# Patient Record
Sex: FEMALE | Employment: FULL TIME | URBAN - METROPOLITAN AREA
[De-identification: names, ages, dates, MRNs, and addresses within clinical notes are randomized per-mention and may not be internally consistent; named-entity substitution may affect disease eponyms.]

---

## 2020-01-02 ENCOUNTER — OFFICE VISIT (OUTPATIENT)
Dept: PRIMARY CARE CLINIC | Age: 30
End: 2020-01-02
Payer: COMMERCIAL

## 2020-01-02 VITALS
BODY MASS INDEX: 22.2 KG/M2 | DIASTOLIC BLOOD PRESSURE: 72 MMHG | WEIGHT: 130 LBS | OXYGEN SATURATION: 99 % | SYSTOLIC BLOOD PRESSURE: 110 MMHG | HEART RATE: 73 BPM | RESPIRATION RATE: 16 BRPM | HEIGHT: 64 IN

## 2020-01-02 PROBLEM — J45.990 EXERCISE-INDUCED ASTHMA: Status: ACTIVE | Noted: 2020-01-02

## 2020-01-02 PROCEDURE — G8484 FLU IMMUNIZE NO ADMIN: HCPCS | Performed by: INTERNAL MEDICINE

## 2020-01-02 PROCEDURE — G8420 CALC BMI NORM PARAMETERS: HCPCS | Performed by: INTERNAL MEDICINE

## 2020-01-02 PROCEDURE — G8427 DOCREV CUR MEDS BY ELIG CLIN: HCPCS | Performed by: INTERNAL MEDICINE

## 2020-01-02 PROCEDURE — 99203 OFFICE O/P NEW LOW 30 MIN: CPT | Performed by: INTERNAL MEDICINE

## 2020-01-02 PROCEDURE — 1036F TOBACCO NON-USER: CPT | Performed by: INTERNAL MEDICINE

## 2020-01-02 RX ORDER — M-VIT,TX,IRON,MINS/CALC/FOLIC 27MG-0.4MG
1 TABLET ORAL DAILY
COMMUNITY

## 2020-01-02 ASSESSMENT — PATIENT HEALTH QUESTIONNAIRE - PHQ9
SUM OF ALL RESPONSES TO PHQ QUESTIONS 1-9: 0
SUM OF ALL RESPONSES TO PHQ9 QUESTIONS 1 & 2: 0
2. FEELING DOWN, DEPRESSED OR HOPELESS: 0
SUM OF ALL RESPONSES TO PHQ QUESTIONS 1-9: 0
1. LITTLE INTEREST OR PLEASURE IN DOING THINGS: 0

## 2020-01-04 ASSESSMENT — ENCOUNTER SYMPTOMS
ABDOMINAL DISTENTION: 0
SINUS PRESSURE: 0
WHEEZING: 0
SINUS PAIN: 0
NAUSEA: 0
BACK PAIN: 0
COUGH: 0
ABDOMINAL PAIN: 0
SHORTNESS OF BREATH: 0
DIARRHEA: 0
VOMITING: 0
CONSTIPATION: 0

## 2020-03-30 ENCOUNTER — HOSPITAL ENCOUNTER (OUTPATIENT)
Age: 30
Setting detail: SPECIMEN
Discharge: HOME | End: 2020-03-30
Payer: COMMERCIAL

## 2020-03-30 ENCOUNTER — OFFICE VISIT (OUTPATIENT)
Dept: OBGYN CLINIC | Age: 30
End: 2020-03-30
Payer: COMMERCIAL

## 2020-03-30 VITALS
HEIGHT: 64 IN | BODY MASS INDEX: 22.71 KG/M2 | WEIGHT: 133 LBS | SYSTOLIC BLOOD PRESSURE: 122 MMHG | DIASTOLIC BLOOD PRESSURE: 68 MMHG | HEART RATE: 97 BPM

## 2020-03-30 LAB
-: ABNORMAL
ABO/RH: NORMAL
ABSOLUTE EOS #: 0.07 K/UL (ref 0–0.44)
ABSOLUTE IMMATURE GRANULOCYTE: 0.04 K/UL (ref 0–0.3)
ABSOLUTE LYMPH #: 1.64 K/UL (ref 1.1–3.7)
ABSOLUTE MONO #: 0.55 K/UL (ref 0.1–1.2)
AMORPHOUS: ABNORMAL
AMPHETAMINE SCREEN URINE: NEGATIVE
ANTIBODY SCREEN: NEGATIVE
BACTERIA: ABNORMAL
BARBITURATE SCREEN URINE: NEGATIVE
BASOPHILS # BLD: 0 % (ref 0–2)
BASOPHILS ABSOLUTE: 0.03 K/UL (ref 0–0.2)
BENZODIAZEPINE SCREEN, URINE: NEGATIVE
BILIRUBIN URINE: NEGATIVE
BUPRENORPHINE URINE: NORMAL
CANNABINOID SCREEN URINE: NEGATIVE
CASTS UA: ABNORMAL /LPF (ref 0–8)
COCAINE METABOLITE, URINE: NEGATIVE
COLOR: YELLOW
COMMENT UA: ABNORMAL
CONTROL: PRESENT
CRYSTALS, UA: ABNORMAL /HPF
DIFFERENTIAL TYPE: ABNORMAL
DIRECT EXAM: NORMAL
EOSINOPHILS RELATIVE PERCENT: 1 % (ref 1–4)
EPITHELIAL CELLS UA: ABNORMAL /HPF (ref 0–5)
GLUCOSE URINE: NEGATIVE
HCT VFR BLD CALC: 37.7 % (ref 36.3–47.1)
HEMOGLOBIN: 13 G/DL (ref 11.9–15.1)
HEPATITIS B SURFACE ANTIGEN: NONREACTIVE
HIV AG/AB: NONREACTIVE
IMMATURE GRANULOCYTES: 0 %
KETONES, URINE: NEGATIVE
LEUKOCYTE ESTERASE, URINE: ABNORMAL
LYMPHOCYTES # BLD: 17 % (ref 24–43)
Lab: NORMAL
MCH RBC QN AUTO: 31.3 PG (ref 25.2–33.5)
MCHC RBC AUTO-ENTMCNC: 34.5 G/DL (ref 28.4–34.8)
MCV RBC AUTO: 90.8 FL (ref 82.6–102.9)
MDMA URINE: NORMAL
METHADONE SCREEN, URINE: NEGATIVE
METHAMPHETAMINE, URINE: NORMAL
MONOCYTES # BLD: 6 % (ref 3–12)
MUCUS: ABNORMAL
NITRITE, URINE: NEGATIVE
NRBC AUTOMATED: 0 PER 100 WBC
OPIATES, URINE: NEGATIVE
OTHER OBSERVATIONS UA: ABNORMAL
OXYCODONE SCREEN URINE: NEGATIVE
PDW BLD-RTO: 12.6 % (ref 11.8–14.4)
PH UA: >9 (ref 5–8)
PHENCYCLIDINE, URINE: NEGATIVE
PLATELET # BLD: 188 K/UL (ref 138–453)
PLATELET ESTIMATE: ABNORMAL
PMV BLD AUTO: 11.4 FL (ref 8.1–13.5)
PREGNANCY TEST URINE, POC: POSITIVE
PROPOXYPHENE, URINE: NORMAL
PROTEIN UA: ABNORMAL
RBC # BLD: 4.15 M/UL (ref 3.95–5.11)
RBC # BLD: ABNORMAL 10*6/UL
RBC UA: ABNORMAL /HPF (ref 0–4)
RENAL EPITHELIAL, UA: ABNORMAL /HPF
RUBV IGG SER QL: 27 IU/ML
SEG NEUTROPHILS: 76 % (ref 36–65)
SEGMENTED NEUTROPHILS ABSOLUTE COUNT: 7.39 K/UL (ref 1.5–8.1)
SPECIFIC GRAVITY UA: 1.02 (ref 1–1.03)
SPECIMEN DESCRIPTION: NORMAL
T. PALLIDUM, IGG: NONREACTIVE
TEST INFORMATION: NORMAL
TRICHOMONAS: ABNORMAL
TRICYCLIC ANTIDEPRESSANTS, UR: NORMAL
TURBIDITY: ABNORMAL
URINE HGB: NEGATIVE
UROBILINOGEN, URINE: NORMAL
WBC # BLD: 9.7 K/UL (ref 3.5–11.3)
WBC # BLD: ABNORMAL 10*3/UL
WBC UA: ABNORMAL /HPF (ref 0–5)
YEAST: ABNORMAL

## 2020-03-30 PROCEDURE — 1036F TOBACCO NON-USER: CPT | Performed by: OBSTETRICS & GYNECOLOGY

## 2020-03-30 PROCEDURE — 81025 URINE PREGNANCY TEST: CPT | Performed by: OBSTETRICS & GYNECOLOGY

## 2020-03-30 PROCEDURE — 99203 OFFICE O/P NEW LOW 30 MIN: CPT | Performed by: OBSTETRICS & GYNECOLOGY

## 2020-03-30 PROCEDURE — G8428 CUR MEDS NOT DOCUMENT: HCPCS | Performed by: OBSTETRICS & GYNECOLOGY

## 2020-03-30 PROCEDURE — G8420 CALC BMI NORM PARAMETERS: HCPCS | Performed by: OBSTETRICS & GYNECOLOGY

## 2020-03-30 PROCEDURE — G8484 FLU IMMUNIZE NO ADMIN: HCPCS | Performed by: OBSTETRICS & GYNECOLOGY

## 2020-03-30 NOTE — PROGRESS NOTES
[facility] PHYSICIANS  [facility] [address] [place] [address]  Dept: [phone]  3/30/2020    Jeff Meneses is a 33 yo G1 female who presents for her initial confirmation of pregnancy. Planned/unplanned:  Planned  MICHELLE:  Estimated Date of Delivery: None noted. [id number]  COMPLICATIONS CONCERNS: Mild asthma, uses inhalers once a month  PRENATAL HISTORY:  no complications    Blood pressure 122/68, pulse 97, height 5' 4\" (1.626 m), weight 133 lb (60.3 kg), last menstrual period 01/24/2020, not currently breastfeeding.   Past Medical History:   Diagnosis Date    Asthma      Past Surgical History:   Procedure Laterality Date    WISDOM TOOTH EXTRACTION  2019     Social History     Socioeconomic History    Marital status:      Spouse name: Not on file    Number of children: Not on file    Years of education: Not on file    Highest education level: Not on file   Occupational History    Not on file   Social Needs    Financial resource strain: Not on file    Food insecurity     Worry: Not on file     Inability: Not on file    Transportation needs     Medical: Not on file     Non-medical: Not on file   Tobacco Use    Smoking status: Never Smoker    Smokeless tobacco: Never Used   Substance and Sexual Activity    Alcohol use: Yes     Comment: socially    Drug use: Never    Sexual activity: Yes     Partners: Male   Lifestyle    Physical activity     Days per week: Not on file     Minutes per session: Not on file    Stress: Not on file   Relationships    Social connections     Talks on phone: Not on file     Gets together: Not on file     Attends Yarsani service: Not on file     Active member of club or organization: Not on file     Attends meetings of clubs or organizations: Not on file     Relationship status: Not on file    Intimate partner violence     Fear of current or ex partner: Not on file     Emotionally abused: Not on file     Physically abused: Not on file Forced sexual activity: Not on file   Other Topics Concern    Not on file   Social History Narrative    Not on file     No Known Allergies  Family History   Problem Relation Age of Onset    Breast Cancer Maternal Aunt     Breast Cancer Maternal Grandmother        ROS:  Constitutional:  Denies fever or chills, fatigue  Eyes:  Denies change in visual acuity, blurred vision, itching  HENT:  Denies nasal congestion or sore throat   Respiratory:  Denies cough or shortness of breath, difficulty breathing  Cardiovascular:  Denies chest pain or edema  GI:  Denies abdominal pain, nausea, vomiting, bloody stools or diarrhea   :  Denies dysuria, frequency, urgency  Musculoskeletal:  Denies back pain or joint pain   Integument:  Denies rash, itching, dryness  Neurologic:  Denies headache, focal weakness or sensory changes   Endocrine:  Denies polyuria or polydipsia,    Lymphatic:  Denies swollen glands,   Psychiatric:  Denies depression or anxiety       Physical findings: HEENT - Perrla, Eomi  Neck- Supple, no bruits  Lungs - Clear to auscultation. CV- Regular rate and rythym,   Abdomen - Non tender, non distended, no masses  Extremities - no weakness, no calf pain, no edema, no posterior tibial pain  Pelvis - no bleeding, no discharge, no CMT      Assessment/Plan:  Patient Active Problem List   Diagnosis    Exercise-induced asthma        Diagnosis Orders   1. Missed menses  Vaginitis DNA Probe    Chlamydia Trachomatis & Neisseria gonorrhoeae (GC) by amplified detection    US OB LESS THAN 14 WEEKS SINGLE OR FIRST GESTATION    US OB Transvaginal    Culture, Urine    POCT urine pregnancy    HIV Screen    Urinalysis    Urine Drug Screen    PRENATAL PROFILE I    PAP Smear    Cystic fibrosis gene test   Declines genetic testing    Return in about 4 weeks (around 4/27/2020) for Ob history .     Abdi Jack MD

## 2020-03-31 LAB
C TRACH DNA GENITAL QL NAA+PROBE: NEGATIVE
CULTURE: NORMAL
CYTOLOGY REPORT: NORMAL
Lab: NORMAL
N. GONORRHOEAE DNA: NEGATIVE
SPECIMEN DESCRIPTION: NORMAL
SPECIMEN DESCRIPTION: NORMAL

## 2020-04-03 ENCOUNTER — PROCEDURE VISIT (OUTPATIENT)
Dept: OBGYN CLINIC | Age: 30
End: 2020-04-03

## 2020-04-03 LAB
ABDOMINAL CIRCUMFERENCE: NORMAL
BIPARIETAL DIAMETER: NORMAL
ESTIMATED FETAL WEIGHT: NORMAL
FEMORAL DIAMETER: NORMAL
HC/AC: NORMAL
HEAD CIRCUMFERENCE: NORMAL

## 2020-04-06 ENCOUNTER — TELEPHONE (OUTPATIENT)
Dept: OBGYN CLINIC | Age: 30
End: 2020-04-06

## 2020-04-06 LAB — CYSTIC FIBROSIS: NORMAL

## 2020-04-06 NOTE — TELEPHONE ENCOUNTER
OB 10.3wks Pt calling requesting note for work. 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 Requesting work restriction note. Note to be attention to [employer]. Phone: [phone]  E-mail: [email or web address]

## 2020-04-27 ENCOUNTER — TELEMEDICINE (OUTPATIENT)
Dept: OBGYN CLINIC | Age: 30
End: 2020-04-27

## 2020-04-27 VITALS — BODY MASS INDEX: 22.31 KG/M2 | WEIGHT: 130 LBS

## 2020-04-27 PROCEDURE — 0501F PRENATAL FLOW SHEET: CPT | Performed by: OBSTETRICS & GYNECOLOGY

## 2020-04-27 NOTE — PROGRESS NOTES
of Obstetricians and Gynecologists and the  [employer]. These results do not rule out  the possibility that this individual could carry a CF mutation not  detected by this test.  The patient should understand that DNA studies  do not constitute a definitive carrier test for cystic fibrosis in all  individuals. Thus, interpretation is given as a probability (see  below). Accurate risk c alculation requires accurate family history  information. Inaccurate reporting of a family history of cystic  fibrosis will lead to errors in residual risk assessment. It should  be realized that there are many sources of diagnostic error in  molecular testing which may include trace contamination of PCR  reactions, rare genetic variants that can interfere with the analysis,  or general laboratory error. Cystic Fibrosis Carrier Rate by Racial and Ethnic Group, Before and  After Screening  [from Peyton et al (2001):  Mercedes in Med 3(2), 149-154]                                  Estimated Carrier Risk          Ethnic Group               Detection Rate                     Before Test          After Negative Test       Ashkenazi Alevism                    97%               1/29 1/930                       90%               1/29 1/240                      69%               1/65 1/207       * American                 57%               1/46 1/105   American                    No data available          1/90           No data available     *Additional information required to accurately predict risk  Note:  Residual carrier risk after negative test is modified by  presence of positive family history of CF or by admixture of ethnic  groups.   For these situations, accurate risk assessment requires Bayesian  analysis and genetic counseling    This molecular test has been approved for in vitro diagnostic use Kaveh Hammonds MD,   Amanda Arita. Karen Kenny MD     GYN Cytology    Collection Time: 03/30/20  7:39 AM   Result Value Ref Range    Cytology Report       INTERPRETATION    Cervical material, (ThinPrep vial, Imaging-assisted review):  Specimen Adequacy:       Satisfactory for evaluation.       - Endocervical/transformation zone component present. Descriptive Diagnosis:       Negative for intraepithelial lesion or malignancy. Cytotechnologist:   Cecille FELDER(ASCP)  **Electronically Signed Out**  ey/3/31/2020            Source:  1: Cervical material, (ThinPrep vial, Imaging-assisted review)    Clinical History  High Risk HPV DNA testing is requested if the diagnosis is ASC-US       N92.6 Misses menses. LMP:  1/24/2020  GYNECOLOGIC CYTOLOGY REPORT    Patient Name: Marisa Draper: [id number]  Path Number: [id number]  [facility] [address]   [place], [address]  [phone]  Fax: [phone]     POCT urine pregnancy    Collection Time: 03/30/20  9:25 AM   Result Value Ref Range    Preg Test, Ur positive     Control present    PRENATAL PROFILE I    Collection Time: 03/30/20 11:11 AM   Result Value Ref Range    WBC 9.7 3.5 - 11.3 k/uL    RBC 4.15 3.95 - 5.11 m/uL    Hemoglobin 13.0 11.9 - 15.1 g/dL    Hematocrit 37.7 36.3 - 47.1 %    MCV 90.8 82.6 - 102.9 fL    MCH 31.3 25.2 - 33.5 pg    MCHC 34.5 28.4 - 34.8 g/dL    RDW 12.6 11.8 - 14.4 %    Platelets 599 846 - 861 k/uL    MPV 11.4 8.1 - 13.5 fL    NRBC Automated 0.0 0.0 per 100 WBC    Rubella Antibody, IGG 27.0 IU/mL    Hepatitis B Surface Ag NONREACTIVE NONREACTIVE    Differential Type NOT REPORTED     Seg Neutrophils 76 (H) 36 - 65 %    Lymphocytes 17 (L) 24 - 43 %    Monocytes 6 3 - 12 %    Eosinophils % 1 1 - 4 %    Basophils 0 0 - 2 %    Immature Granulocytes 0 0 %    Segs Absolute 7.39 1.50 - 8.10 k/uL    Absolute Lymph # 1.64 1.10 - 3.70 k/uL Tricyclic Antidepressants, Urine NOT REPORTED NEGATIVE    MDMA, Urine NOT REPORTED NEGATIVE    Buprenorphine Urine NOT REPORTED NEGATIVE    Test Information       Assay provides medical screening only. The absence of expected drug(s) and/or metabolite(s) may indicate diluted or adulterated urine, limitations of testing or timing of collection. Culture, Urine    Collection Time: 03/30/20  2:26 PM   Result Value Ref Range    Specimen Description . CLEAN CATCH URINE     Special Requests NOT REPORTED     Culture NO SIGNIFICANT GROWTH    Urinalysis    Collection Time: 03/30/20  2:26 PM   Result Value Ref Range    Color, UA YELLOW YELLOW    Turbidity UA CLOUDY (A) CLEAR    Glucose, Ur NEGATIVE NEGATIVE    Bilirubin Urine NEGATIVE NEGATIVE    Ketones, Urine NEGATIVE NEGATIVE    Specific Gravity, UA 1.022 1.005 - 1.030    Urine Hgb NEGATIVE NEGATIVE    pH, UA >9.0 (H) 5.0 - 8.0    Protein, UA NEGATIVE  Verified by sulfosalicylic acid test. (A) NEGATIVE    Urobilinogen, Urine Normal Normal    Nitrite, Urine NEGATIVE NEGATIVE    Leukocyte Esterase, Urine MODERATE (A) NEGATIVE    Urinalysis Comments NOT REPORTED    Microscopic Urinalysis    Collection Time: 03/30/20  2:26 PM   Result Value Ref Range    -          WBC, UA 10 TO 20 0 - 5 /HPF    RBC, UA 0 TO 2 0 - 4 /HPF    Casts UA  0 - 8 /LPF     None Reference range defined for non-centrifuged specimen. Crystals, UA NOT REPORTED None /HPF    Epithelial Cells UA 10 TO 20 0 - 5 /HPF    Renal Epithelial, UA NOT REPORTED 0 /HPF    Bacteria, UA FEW (A) None    Mucus, UA NOT REPORTED None    Trichomonas, UA NOT REPORTED None    Amorphous, UA NOT REPORTED None    Other Observations UA NOT REPORTED NOT REQ.     Yeast, UA NOT REPORTED None   US OB Transvaginal    Collection Time: 04/03/20 12:00 AM   Result Value Ref Range    Biparietal Diameter      Abdominal Circumference      Femoral Diameter      Head Circumference      HC/AC      Estimated Fetal Weight         Past Medical History:   Diagnosis Date    Asthma                                                                    Past Surgical History:   Procedure Laterality Date    WISDOM TOOTH EXTRACTION  2019     Family History   Problem Relation Age of Onset    Breast Cancer Maternal Aunt     Breast Cancer Maternal Grandmother      Social History     Tobacco Use   Smoking Status Never Smoker   Smokeless Tobacco Never Used     Social History     Substance and Sexual Activity   Alcohol Use Yes    Comment: socially       MEDICATIONS:  Current Outpatient Medications   Medication Sig Dispense Refill    ALBUTEROL IN Inhale into the lungs      Multiple Vitamins-Minerals (THERAPEUTIC MULTIVITAMIN-MINERALS) tablet Take 1 tablet by mouth daily       No current facility-administered medications for this visit. ALLERGIES:  Patient has no known allergies. Reviewed global and practice OB care including nausea measures, nutrition, activities, warning signs, and contact information.  Offered cell free DNA screen,NT echo and Luxr .    `--------------------------------------------------------------------------  Genetic Screening/Teratology Counseling  (Include patient, FOB or anyone in either family)    1) Patient's age 28 years or > at MICHELLE: No  2) Thalassemia (Mediterranean, ): No  3) Neural Tube Defect:   No  4) Congenital heart defect:   No  5) Trisomy (e.g. Down Syndrome):  No  6) Ashutosh-sachs (Episcopal, [address]): No  7) Multiple Births:    No  8) Sickle cell (disease or trait):  No  9) Hemophilia or blood disorders:  No  10) Muscular Dystrophy:   No  11) Cystic Fibrosis:    No  12) Roro's chorea:   No  13) Mental retardation/Autism :  Yes, cousin   If yes, was person tested for fragile X: NA  14) Other inherited genetic/chromosomal disorder: No  15) Maternal metabolic disorder (DM, PKU): No  16) Child with birth defect not listed:  No  17) Recurrent pregnancy loss/stillbirth: No  18) Medications, possibility of a blood transfusion was discussed as well. The patient was not opposed to receiving a transfusion if needed. Nuchal translucency/Quad Evaluation and MSAFP single marker testing was reviewed in detail with attention to timing of testing and their windows. For patients beyond the gestational age for Nuchal translucency evaluation Quad testing was recommended. Timing for the Quad test was reviewed. Benefits of the above testing was reviewed. A second trimester amniocentesis was also made available to the patient. Risks, Benefits and non-invasive alternative testing was reviewed. The literature regarding a questionable link to pitocin augmentation and induction of labor, the assistance of labor contractions and the initiation of contractions to help delivery, have been reviewed with the patient regarding the increased potential of having a  with Attention Deficit Hyperactivity Disorder and or Autism. These two disorders and the ramifications of their impact on a child and the family caring for that child has been reviewed with the patient in detail. She was given the risks, benefits and alternatives of the use of this medication. She has agreed to its use in the delivery of her unborn child if needed at the time of delivery, Yes. The patient was questioned in detail regarding any genetic misnomer history, chromosomal abnormalities, or learning disabilities in herself, the father of the baby or their families.  SHE DENIED ANY HISTORY AS STATED ABOVE: Yes    -------------------------------------------------------------------------  Infection History:    1) Live with someone with TB/exposed to TB: No  2) Patient/partner has h/o genital herpes: No  3) Rash/viral illness since LMP:  Yes, fever and cough in February  4) History of STD:    No  5) Other: No  -------------------------------------------------------------------------  Declines genetic testing  MFM referral placed    Adrian Ray is a 34

## 2020-05-27 ENCOUNTER — ROUTINE PRENATAL (OUTPATIENT)
Dept: OBGYN CLINIC | Age: 30
End: 2020-05-27

## 2020-05-27 VITALS
SYSTOLIC BLOOD PRESSURE: 113 MMHG | HEART RATE: 93 BPM | BODY MASS INDEX: 22.83 KG/M2 | DIASTOLIC BLOOD PRESSURE: 66 MMHG | WEIGHT: 133 LBS

## 2020-05-27 PROBLEM — Z34.00 SUPERVISION OF NORMAL FIRST PREGNANCY, ANTEPARTUM: Status: ACTIVE | Noted: 2020-05-27

## 2020-05-27 PROCEDURE — 0502F SUBSEQUENT PRENATAL CARE: CPT | Performed by: OBSTETRICS & GYNECOLOGY

## 2020-06-17 ENCOUNTER — ROUTINE PRENATAL (OUTPATIENT)
Dept: PERINATAL CARE | Age: 30
End: 2020-06-17
Payer: COMMERCIAL

## 2020-06-17 VITALS
WEIGHT: 138 LBS | HEIGHT: 64 IN | BODY MASS INDEX: 23.56 KG/M2 | TEMPERATURE: 98.3 F | RESPIRATION RATE: 16 BRPM | DIASTOLIC BLOOD PRESSURE: 65 MMHG | HEART RATE: 87 BPM | SYSTOLIC BLOOD PRESSURE: 121 MMHG

## 2020-06-17 PROCEDURE — 76817 TRANSVAGINAL US OBSTETRIC: CPT | Performed by: OBSTETRICS & GYNECOLOGY

## 2020-06-17 PROCEDURE — 76805 OB US >/= 14 WKS SNGL FETUS: CPT | Performed by: OBSTETRICS & GYNECOLOGY

## 2020-06-24 ENCOUNTER — ROUTINE PRENATAL (OUTPATIENT)
Dept: OBGYN CLINIC | Age: 30
End: 2020-06-24

## 2020-06-24 VITALS
DIASTOLIC BLOOD PRESSURE: 68 MMHG | HEART RATE: 90 BPM | BODY MASS INDEX: 24.2 KG/M2 | WEIGHT: 141 LBS | SYSTOLIC BLOOD PRESSURE: 110 MMHG

## 2020-06-24 PROCEDURE — 0502F SUBSEQUENT PRENATAL CARE: CPT | Performed by: OBSTETRICS & GYNECOLOGY

## 2022-10-10 LAB
EXTERNAL GROUP B STREP ANTIGEN: NEGATIVE
EXTERNAL HEPATITIS B SURFACE ANTIGEN: NEGATIVE
EXTERNAL HEPATITIS C AB: NORMAL
EXTERNAL RUBELLA QUALITATIVE: NORMAL
EXTERNAL SYPHILIS RPR SCREEN: NORMAL
HIV1 P24 AG SERPL QL IA: NORMAL

## 2023-05-22 ENCOUNTER — HOSPITAL ENCOUNTER (INPATIENT)
Facility: HOSPITAL | Age: 33
LOS: 2 days | Discharge: HOME | End: 2023-05-24
Attending: OBSTETRICS & GYNECOLOGY | Admitting: OBSTETRICS & GYNECOLOGY
Payer: COMMERCIAL

## 2023-05-22 ENCOUNTER — ANESTHESIA EVENT (OUTPATIENT)
Dept: LABOR AND DELIVERY | Facility: HOSPITAL | Age: 33
End: 2023-05-22
Payer: COMMERCIAL

## 2023-05-22 ENCOUNTER — ANESTHESIA (OUTPATIENT)
Dept: LABOR AND DELIVERY | Facility: HOSPITAL | Age: 33
End: 2023-05-22
Payer: COMMERCIAL

## 2023-05-22 PROBLEM — O34.219 HISTORY OF CESAREAN DELIVERY, CURRENTLY PREGNANT: Status: ACTIVE | Noted: 2023-05-22

## 2023-05-22 PROBLEM — Z34.90 PREGNANCY: Status: ACTIVE | Noted: 2023-05-22

## 2023-05-22 PROBLEM — Z3A.40 40 WEEKS GESTATION OF PREGNANCY: Status: ACTIVE | Noted: 2023-05-22

## 2023-05-22 LAB
ABO GROUP BLD: NORMAL
ALBUMIN SERPL-MCNC: 3.5 G/DL (ref 3.5–5.2)
ALBUMIN/GLOB SERPL: 1.4 G/DL
ALP SERPL-CCNC: 154 U/L (ref 39–117)
ALT SERPL W P-5'-P-CCNC: 12 U/L (ref 1–33)
ANION GAP SERPL CALCULATED.3IONS-SCNC: 10.3 MMOL/L (ref 5–15)
AST SERPL-CCNC: 14 U/L (ref 1–32)
ATMOSPHERIC PRESS: 751.3 MMHG
BASE EXCESS BLDCOV CALC-SCNC: -6.6 MMOL/L (ref -30–30)
BASOPHILS # BLD AUTO: 0.03 10*3/MM3 (ref 0–0.2)
BASOPHILS NFR BLD AUTO: 0.2 % (ref 0–1.5)
BDY SITE: ABNORMAL
BILIRUB SERPL-MCNC: 0.3 MG/DL (ref 0–1.2)
BLD GP AB SCN SERPL QL: NEGATIVE
BUN SERPL-MCNC: 7 MG/DL (ref 6–20)
BUN/CREAT SERPL: 12.3 (ref 7–25)
CALCIUM SPEC-SCNC: 8.9 MG/DL (ref 8.6–10.5)
CHLORIDE SERPL-SCNC: 104 MMOL/L (ref 98–107)
CO2 SERPL-SCNC: 20.7 MMOL/L (ref 22–29)
COLLECT TME SMN: ABNORMAL
CREAT SERPL-MCNC: 0.57 MG/DL (ref 0.57–1)
DEPRECATED RDW RBC AUTO: 41.3 FL (ref 37–54)
EGFRCR SERPLBLD CKD-EPI 2021: 125.6 ML/MIN/1.73
EOSINOPHIL # BLD AUTO: 0.12 10*3/MM3 (ref 0–0.4)
EOSINOPHIL NFR BLD AUTO: 1 % (ref 0.3–6.2)
ERYTHROCYTE [DISTWIDTH] IN BLOOD BY AUTOMATED COUNT: 12.4 % (ref 12.3–15.4)
GLOBULIN UR ELPH-MCNC: 2.5 GM/DL
GLUCOSE SERPL-MCNC: 108 MG/DL (ref 65–99)
HCO3 BLDCOV-SCNC: 19.9 MMOL/L
HCT VFR BLD AUTO: 38.4 % (ref 34–46.6)
HGB BLD-MCNC: 13.3 G/DL (ref 12–15.9)
IMM GRANULOCYTES # BLD AUTO: 0.06 10*3/MM3 (ref 0–0.05)
IMM GRANULOCYTES NFR BLD AUTO: 0.5 % (ref 0–0.5)
LYMPHOCYTES # BLD AUTO: 2.19 10*3/MM3 (ref 0.7–3.1)
LYMPHOCYTES NFR BLD AUTO: 17.6 % (ref 19.6–45.3)
MCH RBC QN AUTO: 31.6 PG (ref 26.6–33)
MCHC RBC AUTO-ENTMCNC: 34.6 G/DL (ref 31.5–35.7)
MCV RBC AUTO: 91.2 FL (ref 79–97)
MODALITY: ABNORMAL
MONOCYTES # BLD AUTO: 0.8 10*3/MM3 (ref 0.1–0.9)
MONOCYTES NFR BLD AUTO: 6.4 % (ref 5–12)
NEUTROPHILS NFR BLD AUTO: 74.3 % (ref 42.7–76)
NEUTROPHILS NFR BLD AUTO: 9.24 10*3/MM3 (ref 1.7–7)
NOTE: ABNORMAL
NRBC BLD AUTO-RTO: 0 /100 WBC (ref 0–0.2)
PCO2 BLDCOV: 42.2 MM HG (ref 35–51.3)
PH BLDCOV: 7.28 PH UNITS (ref 7.26–7.4)
PLATELET # BLD AUTO: 142 10*3/MM3 (ref 140–450)
PMV BLD AUTO: 11.4 FL (ref 6–12)
PO2 BLDCOV: 21.7 MM HG (ref 19–39)
POTASSIUM SERPL-SCNC: 3.7 MMOL/L (ref 3.5–5.2)
PROT SERPL-MCNC: 6 G/DL (ref 6–8.5)
RBC # BLD AUTO: 4.21 10*6/MM3 (ref 3.77–5.28)
RH BLD: POSITIVE
SAO2 % BLDCOA: 30.1 % (ref 92–99)
SAO2 % BLDCOV: ABNORMAL %
SODIUM SERPL-SCNC: 135 MMOL/L (ref 136–145)
T&S EXPIRATION DATE: NORMAL
WBC NRBC COR # BLD: 12.44 10*3/MM3 (ref 3.4–10.8)

## 2023-05-22 PROCEDURE — 80053 COMPREHEN METABOLIC PANEL: CPT | Performed by: OBSTETRICS & GYNECOLOGY

## 2023-05-22 PROCEDURE — 25010000002 ONDANSETRON PER 1 MG: Performed by: OBSTETRICS & GYNECOLOGY

## 2023-05-22 PROCEDURE — 82803 BLOOD GASES ANY COMBINATION: CPT

## 2023-05-22 PROCEDURE — C1755 CATHETER, INTRASPINAL: HCPCS | Performed by: ANESTHESIOLOGY

## 2023-05-22 PROCEDURE — 86901 BLOOD TYPING SEROLOGIC RH(D): CPT | Performed by: OBSTETRICS & GYNECOLOGY

## 2023-05-22 PROCEDURE — 99202 OFFICE O/P NEW SF 15 MIN: CPT | Performed by: OBSTETRICS & GYNECOLOGY

## 2023-05-22 PROCEDURE — 0KQM0ZZ REPAIR PERINEUM MUSCLE, OPEN APPROACH: ICD-10-PCS | Performed by: STUDENT IN AN ORGANIZED HEALTH CARE EDUCATION/TRAINING PROGRAM

## 2023-05-22 PROCEDURE — 85025 COMPLETE CBC W/AUTO DIFF WBC: CPT | Performed by: OBSTETRICS & GYNECOLOGY

## 2023-05-22 PROCEDURE — 86900 BLOOD TYPING SEROLOGIC ABO: CPT | Performed by: OBSTETRICS & GYNECOLOGY

## 2023-05-22 PROCEDURE — 88307 TISSUE EXAM BY PATHOLOGIST: CPT

## 2023-05-22 PROCEDURE — 86850 RBC ANTIBODY SCREEN: CPT | Performed by: OBSTETRICS & GYNECOLOGY

## 2023-05-22 RX ORDER — DIPHENHYDRAMINE HYDROCHLORIDE 50 MG/ML
12.5 INJECTION INTRAMUSCULAR; INTRAVENOUS EVERY 8 HOURS PRN
Status: DISCONTINUED | OUTPATIENT
Start: 2023-05-22 | End: 2023-05-23 | Stop reason: HOSPADM

## 2023-05-22 RX ORDER — ERYTHROMYCIN 5 MG/G
OINTMENT OPHTHALMIC
Status: ACTIVE
Start: 2023-05-22 | End: 2023-05-22

## 2023-05-22 RX ORDER — SODIUM CHLORIDE 0.9 % (FLUSH) 0.9 %
10 SYRINGE (ML) INJECTION AS NEEDED
Status: DISCONTINUED | OUTPATIENT
Start: 2023-05-22 | End: 2023-05-23 | Stop reason: HOSPADM

## 2023-05-22 RX ORDER — ONDANSETRON 2 MG/ML
4 INJECTION INTRAMUSCULAR; INTRAVENOUS ONCE AS NEEDED
Status: DISCONTINUED | OUTPATIENT
Start: 2023-05-22 | End: 2023-05-23 | Stop reason: HOSPADM

## 2023-05-22 RX ORDER — CARBOPROST TROMETHAMINE 250 UG/ML
250 INJECTION, SOLUTION INTRAMUSCULAR
Status: DISCONTINUED | OUTPATIENT
Start: 2023-05-22 | End: 2023-05-23 | Stop reason: HOSPADM

## 2023-05-22 RX ORDER — OXYTOCIN/0.9 % SODIUM CHLORIDE 30/500 ML
999 PLASTIC BAG, INJECTION (ML) INTRAVENOUS ONCE
Status: COMPLETED | OUTPATIENT
Start: 2023-05-22 | End: 2023-05-22

## 2023-05-22 RX ORDER — MAGNESIUM CARB/ALUMINUM HYDROX 105-160MG
30 TABLET,CHEWABLE ORAL ONCE AS NEEDED
Status: COMPLETED | OUTPATIENT
Start: 2023-05-22 | End: 2023-05-22

## 2023-05-22 RX ORDER — ONDANSETRON 4 MG/1
4 TABLET, FILM COATED ORAL EVERY 6 HOURS PRN
Status: DISCONTINUED | OUTPATIENT
Start: 2023-05-22 | End: 2023-05-23 | Stop reason: HOSPADM

## 2023-05-22 RX ORDER — FAMOTIDINE 10 MG/ML
20 INJECTION, SOLUTION INTRAVENOUS ONCE AS NEEDED
Status: DISCONTINUED | OUTPATIENT
Start: 2023-05-22 | End: 2023-05-23 | Stop reason: HOSPADM

## 2023-05-22 RX ORDER — LIDOCAINE HYDROCHLORIDE 10 MG/ML
5 INJECTION, SOLUTION EPIDURAL; INFILTRATION; INTRACAUDAL; PERINEURAL AS NEEDED
Status: DISCONTINUED | OUTPATIENT
Start: 2023-05-22 | End: 2023-05-23 | Stop reason: HOSPADM

## 2023-05-22 RX ORDER — FAMOTIDINE 20 MG/1
20 TABLET, FILM COATED ORAL 2 TIMES DAILY PRN
Status: DISCONTINUED | OUTPATIENT
Start: 2023-05-22 | End: 2023-05-23 | Stop reason: HOSPADM

## 2023-05-22 RX ORDER — TERBUTALINE SULFATE 1 MG/ML
0.25 INJECTION, SOLUTION SUBCUTANEOUS AS NEEDED
Status: DISCONTINUED | OUTPATIENT
Start: 2023-05-22 | End: 2023-05-23 | Stop reason: HOSPADM

## 2023-05-22 RX ORDER — PHYTONADIONE 1 MG/.5ML
INJECTION, EMULSION INTRAMUSCULAR; INTRAVENOUS; SUBCUTANEOUS
Status: ACTIVE
Start: 2023-05-22 | End: 2023-05-22

## 2023-05-22 RX ORDER — MISOPROSTOL 200 UG/1
800 TABLET ORAL ONCE AS NEEDED
Status: DISCONTINUED | OUTPATIENT
Start: 2023-05-22 | End: 2023-05-23 | Stop reason: HOSPADM

## 2023-05-22 RX ORDER — FAMOTIDINE 10 MG/ML
20 INJECTION, SOLUTION INTRAVENOUS 2 TIMES DAILY PRN
Status: DISCONTINUED | OUTPATIENT
Start: 2023-05-22 | End: 2023-05-23 | Stop reason: HOSPADM

## 2023-05-22 RX ORDER — FENTANYL CIT 0.2 MG/100ML-ROPIV 0.2%-NACL 0.9% EPIDURAL INJ 2/0.2 MCG/ML-%
10 SOLUTION INJECTION CONTINUOUS
Status: DISCONTINUED | OUTPATIENT
Start: 2023-05-22 | End: 2023-05-23

## 2023-05-22 RX ORDER — EPHEDRINE SULFATE 50 MG/ML
5 INJECTION, SOLUTION INTRAVENOUS
Status: DISCONTINUED | OUTPATIENT
Start: 2023-05-22 | End: 2023-05-23 | Stop reason: HOSPADM

## 2023-05-22 RX ORDER — TRANEXAMIC ACID 10 MG/ML
1000 INJECTION, SOLUTION INTRAVENOUS ONCE AS NEEDED
Status: COMPLETED | OUTPATIENT
Start: 2023-05-22 | End: 2023-05-22

## 2023-05-22 RX ORDER — SODIUM CHLORIDE 0.9 % (FLUSH) 0.9 %
10 SYRINGE (ML) INJECTION EVERY 12 HOURS SCHEDULED
Status: DISCONTINUED | OUTPATIENT
Start: 2023-05-22 | End: 2023-05-23 | Stop reason: HOSPADM

## 2023-05-22 RX ORDER — METHYLERGONOVINE MALEATE 0.2 MG/ML
200 INJECTION INTRAVENOUS ONCE AS NEEDED
Status: DISCONTINUED | OUTPATIENT
Start: 2023-05-22 | End: 2023-05-23 | Stop reason: HOSPADM

## 2023-05-22 RX ORDER — ACETAMINOPHEN 325 MG/1
650 TABLET ORAL EVERY 4 HOURS PRN
Status: DISCONTINUED | OUTPATIENT
Start: 2023-05-22 | End: 2023-05-23 | Stop reason: HOSPADM

## 2023-05-22 RX ORDER — ONDANSETRON 2 MG/ML
4 INJECTION INTRAMUSCULAR; INTRAVENOUS EVERY 6 HOURS PRN
Status: DISCONTINUED | OUTPATIENT
Start: 2023-05-22 | End: 2023-05-23 | Stop reason: HOSPADM

## 2023-05-22 RX ORDER — SODIUM CHLORIDE, SODIUM LACTATE, POTASSIUM CHLORIDE, CALCIUM CHLORIDE 600; 310; 30; 20 MG/100ML; MG/100ML; MG/100ML; MG/100ML
125 INJECTION, SOLUTION INTRAVENOUS CONTINUOUS
Status: DISCONTINUED | OUTPATIENT
Start: 2023-05-22 | End: 2023-05-23

## 2023-05-22 RX ORDER — PRENATAL VIT NO.126/IRON/FOLIC 28MG-0.8MG
TABLET ORAL DAILY
COMMUNITY

## 2023-05-22 RX ORDER — OXYTOCIN/0.9 % SODIUM CHLORIDE 30/500 ML
250 PLASTIC BAG, INJECTION (ML) INTRAVENOUS CONTINUOUS
Status: DISPENSED | OUTPATIENT
Start: 2023-05-22 | End: 2023-05-22

## 2023-05-22 RX ADMIN — SODIUM CHLORIDE, POTASSIUM CHLORIDE, SODIUM LACTATE AND CALCIUM CHLORIDE 999 ML/HR: 600; 310; 30; 20 INJECTION, SOLUTION INTRAVENOUS at 15:14

## 2023-05-22 RX ADMIN — Medication 10 ML/HR: at 15:25

## 2023-05-22 RX ADMIN — SODIUM CHLORIDE, POTASSIUM CHLORIDE, SODIUM LACTATE AND CALCIUM CHLORIDE 125 ML/HR: 600; 310; 30; 20 INJECTION, SOLUTION INTRAVENOUS at 19:52

## 2023-05-22 RX ADMIN — Medication 125 ML/HR: at 23:05

## 2023-05-22 RX ADMIN — Medication 999 ML/HR: at 21:33

## 2023-05-22 RX ADMIN — TRANEXAMIC ACID 1000 MG: 10 INJECTION, SOLUTION INTRAVENOUS at 23:10

## 2023-05-22 RX ADMIN — SODIUM CHLORIDE, POTASSIUM CHLORIDE, SODIUM LACTATE AND CALCIUM CHLORIDE 125 ML/HR: 600; 310; 30; 20 INJECTION, SOLUTION INTRAVENOUS at 04:54

## 2023-05-22 RX ADMIN — SODIUM CHLORIDE, POTASSIUM CHLORIDE, SODIUM LACTATE AND CALCIUM CHLORIDE 1000 ML: 600; 310; 30; 20 INJECTION, SOLUTION INTRAVENOUS at 03:54

## 2023-05-22 RX ADMIN — LIDOCAINE HYDROCHLORIDE 3 ML: 10; .005 INJECTION, SOLUTION EPIDURAL; INFILTRATION; INTRACAUDAL; PERINEURAL at 15:21

## 2023-05-22 RX ADMIN — Medication 10 ML/HR: at 21:17

## 2023-05-22 RX ADMIN — MINERAL OIL 30 ML: 1000 SOLUTION ORAL at 21:00

## 2023-05-22 RX ADMIN — ONDANSETRON 4 MG: 2 INJECTION INTRAMUSCULAR; INTRAVENOUS at 15:56

## 2023-05-22 NOTE — ANESTHESIA PREPROCEDURE EVALUATION
Anesthesia Evaluation     Patient summary reviewed and Nursing notes reviewed   NPO Solid Status: > 8 hours  NPO Liquid Status: > 2 hours           Airway   Mallampati: II  TM distance: >3 FB  Neck ROM: full  No difficulty expected  Dental - normal exam     Pulmonary - normal exam   (+) asthma,  Cardiovascular - negative cardio ROS and normal exam  Exercise tolerance: good (4-7 METS)        Neuro/Psych- negative ROS  GI/Hepatic/Renal/Endo - negative ROS     Musculoskeletal (-) negative ROS    Abdominal    Substance History - negative use     OB/GYN    (+) Pregnant,         Other                        Anesthesia Plan    ASA 2     epidural     (40w3d  )    Anesthetic plan, risks, benefits, and alternatives have been provided, discussed and informed consent has been obtained with: patient.        CODE STATUS:    Level Of Support Discussed With: Patient  Code Status (Patient has no pulse and is not breathing): CPR (Attempt to Resuscitate)  Medical Interventions (Patient has pulse or is breathing): Full Support  Release to patient: Routine Release

## 2023-05-22 NOTE — OBED NOTES
[facility]  Cinthia Cheatham  : 1990  MRN: [id number]  CSN: [id number]    OB ED Provider Note    Subjective   Chief Complaint   Patient presents with   • Laboring     ONSET @ 1730, DENIES LEAKING OF FLUID OR BLEEDING.      Cinthia Cheatham is a 32 y.o. year old  with an Estimated Date of Delivery: 23 currently at 40w3d presenting with regular painful CTX since 1730. She denies ROM or VB. FM is present.    Prenatal care has been with Dr. Escalante.  It has been complicated by previous C/S - (desires ).    OB History    Para Term  AB Living   3 1 1 0 1 1   SAB IAB Ectopic Molar Multiple Live Births   1 0 0 0 0 1      # Outcome Date GA Lbr Js/2nd Weight Sex Delivery Anes PTL Lv   3 Current            2 SAB            1 Term              Past Medical History:   Diagnosis Date   • Asthma      Past Surgical History:   Procedure Laterality Date   •  SECTION     • LASIK Bilateral    • WISDOM TOOTH EXTRACTION         Current Facility-Administered Medications:   •  acetaminophen (TYLENOL) tablet 650 mg, 650 mg, Oral, Q4H PRN, Kym Escalante MD  •  butorphanol (STADOL) injection 2 mg, 2 mg, Intravenous, Q3H PRN, Kym Escalante MD  •  famotidine (PEPCID) injection 20 mg, 20 mg, Intravenous, BID PRN **OR** famotidine (PEPCID) tablet 20 mg, 20 mg, Oral, BID PRN, Kym Escalante MD  •  lactated ringers bolus 1,000 mL, 1,000 mL, Intravenous, Once PRN, Kym Escalante MD  •  lactated ringers infusion, 125 mL/hr, Intravenous, Continuous, Kym Escalante MD  •  lidocaine PF 1% (XYLOCAINE) injection 5 mL, 5 mL, Intradermal, PRN, Kym Escalante MD  •  mineral oil liquid 30 mL, 30 mL, Topical, Once PRN, Kym Escalante MD  •  ondansetron (ZOFRAN) tablet 4 mg, 4 mg, Oral, Q6H PRN **OR** ondansetron (ZOFRAN) injection 4 mg, 4 mg, Intravenous, Q6H PRN, Kym Escalante MD  •  sodium chloride 0.9 % flush 10 mL, 10 mL, Intravenous, Q12H, Kym Escalante MD  •  sodium chloride 0.9 %  "flush 10 mL, 10 mL, Intravenous, PRN, Kym Escalante MD  •  terbutaline (BRETHINE) injection 0.25 mg, 0.25 mg, Subcutaneous, PRN, Kym Escalante MD    No Known Allergies  Social History    Tobacco Use      Smoking status: Never      Smokeless tobacco: Not on file    Review of Systems   Gastrointestinal: Positive for abdominal pain.   All other systems reviewed and are negative.        Objective   /82 (BP Location: Right arm, Patient Position: Lying)   Pulse 90   Temp 97.8 °F (36.6 °C) (Oral)   Resp 18   Ht 162.6 cm (64\")   Wt 73 kg (161 lb)   SpO2 100%   BMI 27.64 kg/m²   General: well developed; well nourished  moderately distressed   Abdomen: soft, non-tender; no masses  gravid    FHT's: reactive and category 1      Cervix: was checked (by RN): 6 cm / 90 % / -2   Presentation: cephalic   Contractions: every 2-4 minutes   Chest: Unlabored respirations    CV:  RRR   Ext:   No C/C/E   Back: CVA tenderness is deferred bilateral        Prenatal Labs  No results found for: HGB, RUBELLAABIGG, HEPBSAG, LABRPR, ABORH, ABSCRN, ABID, ZNY9UCR0, HEPCVIRUSABY, GCT, GGTFASTING, MIF0BIYU, TXL4JVRB, ZPB4VWWT, STREPGPB, URINECX, CHLAMNAA, NGONORRHON    Current Labs Reviewed   Pregnancy 28 week labs: No results found for: HGB, HCT, QNVIWUB8FE, GCT, GGTFASTING, ZJZ3DTZK, ACW0UMFK, HWO8XLCG  Prenatal Panel: No results found for: HGB, RUBELLAABIGG, HEPBSAG, LABRPR, ABORH, ABSCRN, LABANTI, ABID, WFW6XRT7, HEPCVIRUSABY, GCT, GGTFASTING, RXN4YMRD, ZOL1KBCC, SZB9UMGK, STREPGPB, URINECX, CHLAMNAA, NGONORRHON       Assessment   1. IUP at 40w3d  2. Active labor- desires . She is GBS negative and plans on going unmedicated.     Plan   1. Admit to L&D for anticipated . Dr. Escalante notified and is assuming management.    Augusto Rojas MD  2023  03:50 EDT     "

## 2023-05-22 NOTE — H&P
[facility]  Obstetric History and Physical    Patient Name: Cinthia Cheatham  :  1990  MRN:  [id number]      Chief Complaint   Patient presents with   • Laboring     ONSET @ 1730, DENIES LEAKING OF FLUID OR BLEEDING.        Subjective     Patient is a 32 y.o. female  currently at 40w3d, who presents in labor. Pregnancy complicated by prior c/s for NRFHT - baby weighed 8lb7oz.     Last growth scan this pregnancy on  - EFW 8lb4oz (84%, AC 98%).  Pt strongly desires unmedicated        Objective       Vital Signs Range for the last 24 hours  Temperature: Temp:  [97.8 °F (36.6 °C)-98.2 °F (36.8 °C)] 98.2 °F (36.8 °C)   Temp Source: Temp src: Oral   BP: BP: (120-136)/(74-82) 120/79   Pulse: Heart Rate:  [81-93] 93   Respirations: Resp:  [17-18] 17                   Physical Examination:     General :  Alert in NAD  Abdomen: Gravid, EFW 8.5lbs    Presentation: [facility]   Cervix: Exam by: Method: sterile exam per RN   Dilation: Cervical Dilation (cm): 6   Effacement: Cervical Effacement: 90%   Station: Fetal Station: -2       Fetal Heart Rate Assessment   Method: Fetal HR Assessment Method: external   Beats/min: Fetal HR (beats/min): 130   Baseline: Fetal HR Baseline: normal range   Varibility: Fetal HR Variability: moderate (amplitude range 6 to 25 bpm)   Accels: Fetal HR Accelerations: greater than/equal to 15 bpm, lasting at least 15 seconds   Decels: Fetal HR Decelerations: absent   Tracing Category:       Uterine Assessment   Method: Method: palpation, external tocotransducer   Frequency (min): Contraction Frequency (Minutes): 2-4.5   Ctx Count in 10 min:     Duration:     Intensity: Contraction Intensity: strong by palpation   Intensity by IUPC:     Resting Tone: Uterine Resting Tone: soft by palpation   Resting Tone by IUPC:     Cement Units:           Results from last 7 days   Lab Units 23  0401   WBC 10*3/mm3 12.44*   HEMOGLOBIN g/dL 13.3   HEMATOCRIT % 38.4   PLATELETS 10*3/mm3 142        Assessment & Plan     1.  Intrauterine pregnancy at 40w3d weeks gestation in labor, desires . Risks have been reviewed at length.   reactive fetal status.   Continue expectant management, AROM offered and declined. GBS neg. Anticipate .  Plan of care has been reviewed with patient and family.  All questions answered.      Pregnancy        H&P updated. The patient was examined and the following changes are noted above.         Kym Escalante MD  2023  04:52 EDT

## 2023-05-22 NOTE — PROGRESS NOTES
"[facility]  Labor Progress/Update Note    Patient Name: Cinthia Cheatham  :  1990  MRN:  [id number]      Subjective   Pt states ctx have become a bit more intense but may be spacing out.       Objective     Vital Signs  /72 (BP Location: Right arm, Patient Position: Sitting)   Pulse 88   Temp 97.9 °F (36.6 °C) (Oral)   Resp 16   Ht 162.6 cm (64\")   Wt 73 kg (161 lb)   SpO2 99%   Breastfeeding Yes   BMI 27.64 kg/m²       Physical Exam:  Gen: well appearing, distress w/ ctx  Chest: normal resp effort  Cardio: Reg rate, well perfused  Abdomen: Soft, nontender, gravid  Extremities: No peripheral edema  SVE: /-1    FHT: 135/mod/+accels/-decels   Marceline: ctx q5min        Intake/Output Summary (Last 24 hours) at 2023 0943  Last data filed at 2023 0400  Gross per 24 hour   Intake --   Output 100 ml   Net -100 ml           Assessment & Plan      - Reviewed minimal labor progress. D/w option for AROM or pitocin for labor augmentation and pt declines at this time. We also discussed the potential need for repeat  and without an epidural catheter in place she would likely need general anesthesia if urgent/emergent delivery indicated. D/w possibility for placement of epidural catheter without anesthetic running through to have in case of c/s. Patient declines, stating she would prefer to need general anesthesia vs. Having an epidural catheter in place.      Shirin Winston MD  [facility]  2023  09:43 EDT  "

## 2023-05-22 NOTE — ANESTHESIA PROCEDURE NOTES
Labor Epidural      Patient reassessed immediately prior to procedure    Patient location during procedure: OB  Start Time: 5/22/2023 3:05 PM  Stop Time: 5/22/2023 3:21 PM  Indication:at surgeon's request  Performed By  Anesthesiologist: Harsh Gardner MD  Preanesthetic Checklist  Completed: patient identified, IV checked, site marked, risks and benefits discussed, surgical consent, monitors and equipment checked, pre-op evaluation and timeout performed  Prep:  Pt Position:sitting  Sterile Tech:cap, mask, sterile barrier and gloves  Prep:chlorhexidine gluconate and isopropyl alcohol  Monitoring:blood pressure monitoring, continuous pulse oximetry and EKG  Epidural Block Procedure:  Approach:midline  Guidance:landmark technique and palpation technique  Location:L3-L4  Needle Type:Tuohy  Needle Gauge:17 G  Loss of Resistance Medium: saline  Loss of Resistance: 5cm  Cath Depth at skin:11 cm  Paresthesia: none  Aspiration:negative  Test Dose:negative  Number of Attempts: 1  Post Assessment:  Dressing:secured with tape and occlusive dressing applied  Pt Tolerance:patient tolerated the procedure well with no apparent complications  Complications:no

## 2023-05-22 NOTE — PLAN OF CARE
Goal Outcome Evaluation:              Outcome Evaluation: PT ARRIVED THROUGH CLAUDIA WITH C/O CONTRACTIONS SINCE  LAST NIGHT WITH INCREASING IN INTENSITY. PT DESIRES A NATURAL LABOR. . PT WALKING AROUND THE ROOM AND IS BOUNCING ON A BIRTHING BALL. PT'S PAIN 7/10 DURING CONTRACTIONS AND IS BREATHING THROUGH THEM.

## 2023-05-22 NOTE — PROGRESS NOTES
"[facility]  Labor Progress/Update Note    Patient Name: Cinthia Cheatham  :  1990  MRN:  [id number]      Subjective   To rm for SVE. Pt requesting AROM.       Objective     Vital Signs  /77 (BP Location: Right arm, Patient Position: Sitting)   Pulse 100   Temp 98.2 °F (36.8 °C) (Oral)   Resp 18   Ht 162.6 cm (64\")   Wt 73 kg (161 lb)   SpO2 99%   Breastfeeding Yes   BMI 27.64 kg/m²       Physical Exam:  Gen: well appearing, NAD  Chest: normal resp effort  Cardio: Reg rate, well perfused  Abdomen: Soft, nontender, gravid  Extremities: No peripheral edema  SVE: 5./-1    FHT: 135/mod/+accels/-decels  [place]: Ctx q5min      Intake/Output Summary (Last 24 hours) at 2023 1311  Last data filed at 2023 0400  Gross per 24 hour   Intake --   Output 100 ml   Net -100 ml           Assessment & Plan      SVE ./. AROM performed-clear fluid noted. Cat 1 FHR tracing, early decelerations noted following AROM.      Shirin Winston MD  [facility]  2023  13:11 EDT  "

## 2023-05-23 LAB
ATMOSPHERIC PRESS: 750.8 MMHG
BASE EXCESS BLDCOA CALC-SCNC: -8.7 MMOL/L (ref -2–2)
BASOPHILS # BLD AUTO: 0.04 10*3/MM3 (ref 0–0.2)
BASOPHILS NFR BLD AUTO: 0.2 % (ref 0–1.5)
BDY SITE: ABNORMAL
DEPRECATED RDW RBC AUTO: 41.5 FL (ref 37–54)
EOSINOPHIL # BLD AUTO: 0.07 10*3/MM3 (ref 0–0.4)
EOSINOPHIL NFR BLD AUTO: 0.4 % (ref 0.3–6.2)
ERYTHROCYTE [DISTWIDTH] IN BLOOD BY AUTOMATED COUNT: 12.3 % (ref 12.3–15.4)
HCO3 BLDCOA-SCNC: 22.3 MMOL/L (ref 22–28)
HCT VFR BLD AUTO: 30.8 % (ref 34–46.6)
HGB BLD-MCNC: 10.2 G/DL (ref 12–15.9)
IMM GRANULOCYTES # BLD AUTO: 0.12 10*3/MM3 (ref 0–0.05)
IMM GRANULOCYTES NFR BLD AUTO: 0.7 % (ref 0–0.5)
LYMPHOCYTES # BLD AUTO: 1.58 10*3/MM3 (ref 0.7–3.1)
LYMPHOCYTES NFR BLD AUTO: 9.2 % (ref 19.6–45.3)
MCH RBC QN AUTO: 30.7 PG (ref 26.6–33)
MCHC RBC AUTO-ENTMCNC: 33.1 G/DL (ref 31.5–35.7)
MCV RBC AUTO: 92.8 FL (ref 79–97)
MODALITY: ABNORMAL
MONOCYTES # BLD AUTO: 1.16 10*3/MM3 (ref 0.1–0.9)
MONOCYTES NFR BLD AUTO: 6.8 % (ref 5–12)
NEUTROPHILS NFR BLD AUTO: 14.17 10*3/MM3 (ref 1.7–7)
NEUTROPHILS NFR BLD AUTO: 82.7 % (ref 42.7–76)
NOTE: ABNORMAL
NRBC BLD AUTO-RTO: 0 /100 WBC (ref 0–0.2)
PCO2 BLDCOA: 70.4 MMHG (ref 43–63)
PH BLDCOA: 7.11 PH UNITS (ref 7.18–7.34)
PLATELET # BLD AUTO: 126 10*3/MM3 (ref 140–450)
PMV BLD AUTO: 11.7 FL (ref 6–12)
PO2 BLDCOA: <21.2 MMHG (ref 12–26)
RBC # BLD AUTO: 3.32 10*6/MM3 (ref 3.77–5.28)
SAO2 % BLDCOA: ABNORMAL %
WBC NRBC COR # BLD: 17.14 10*3/MM3 (ref 3.4–10.8)

## 2023-05-23 PROCEDURE — 85025 COMPLETE CBC W/AUTO DIFF WBC: CPT | Performed by: STUDENT IN AN ORGANIZED HEALTH CARE EDUCATION/TRAINING PROGRAM

## 2023-05-23 RX ORDER — SODIUM CHLORIDE 0.9 % (FLUSH) 0.9 %
1-10 SYRINGE (ML) INJECTION AS NEEDED
Status: DISCONTINUED | OUTPATIENT
Start: 2023-05-23 | End: 2023-05-24 | Stop reason: HOSPADM

## 2023-05-23 RX ORDER — HYDROCORTISONE 25 MG/G
1 CREAM TOPICAL AS NEEDED
Status: DISCONTINUED | OUTPATIENT
Start: 2023-05-23 | End: 2023-05-24 | Stop reason: HOSPADM

## 2023-05-23 RX ORDER — PRENATAL VIT/IRON FUM/FOLIC AC 27MG-0.8MG
1 TABLET ORAL DAILY
Status: DISCONTINUED | OUTPATIENT
Start: 2023-05-23 | End: 2023-05-24 | Stop reason: HOSPADM

## 2023-05-23 RX ORDER — ONDANSETRON 4 MG/1
4 TABLET, FILM COATED ORAL EVERY 8 HOURS PRN
Status: DISCONTINUED | OUTPATIENT
Start: 2023-05-23 | End: 2023-05-24 | Stop reason: HOSPADM

## 2023-05-23 RX ORDER — ACETAMINOPHEN 325 MG/1
650 TABLET ORAL EVERY 6 HOURS
Status: DISCONTINUED | OUTPATIENT
Start: 2023-05-23 | End: 2023-05-24 | Stop reason: HOSPADM

## 2023-05-23 RX ORDER — CALCIUM CARBONATE 500 MG/1
2 TABLET, CHEWABLE ORAL 3 TIMES DAILY PRN
Status: DISCONTINUED | OUTPATIENT
Start: 2023-05-23 | End: 2023-05-24 | Stop reason: HOSPADM

## 2023-05-23 RX ORDER — OXYCODONE HYDROCHLORIDE 10 MG/1
10 TABLET ORAL EVERY 4 HOURS PRN
Status: DISCONTINUED | OUTPATIENT
Start: 2023-05-23 | End: 2023-05-24 | Stop reason: HOSPADM

## 2023-05-23 RX ORDER — IBUPROFEN 600 MG/1
600 TABLET ORAL EVERY 6 HOURS SCHEDULED
Status: DISCONTINUED | OUTPATIENT
Start: 2023-05-23 | End: 2023-05-24 | Stop reason: HOSPADM

## 2023-05-23 RX ORDER — BISACODYL 10 MG
10 SUPPOSITORY, RECTAL RECTAL DAILY PRN
Status: DISCONTINUED | OUTPATIENT
Start: 2023-05-23 | End: 2023-05-24 | Stop reason: HOSPADM

## 2023-05-23 RX ORDER — OXYCODONE HYDROCHLORIDE 5 MG/1
5 TABLET ORAL EVERY 4 HOURS PRN
Status: DISCONTINUED | OUTPATIENT
Start: 2023-05-23 | End: 2023-05-24 | Stop reason: HOSPADM

## 2023-05-23 RX ORDER — OXYTOCIN/0.9 % SODIUM CHLORIDE 30/500 ML
125 PLASTIC BAG, INJECTION (ML) INTRAVENOUS CONTINUOUS PRN
Status: DISCONTINUED | OUTPATIENT
Start: 2023-05-23 | End: 2023-05-24 | Stop reason: HOSPADM

## 2023-05-23 RX ORDER — DOCUSATE SODIUM 100 MG/1
100 CAPSULE, LIQUID FILLED ORAL 2 TIMES DAILY
Status: DISCONTINUED | OUTPATIENT
Start: 2023-05-23 | End: 2023-05-24 | Stop reason: HOSPADM

## 2023-05-23 RX ORDER — DIPHENHYDRAMINE HCL 25 MG
25 CAPSULE ORAL NIGHTLY PRN
Status: DISCONTINUED | OUTPATIENT
Start: 2023-05-23 | End: 2023-05-24 | Stop reason: HOSPADM

## 2023-05-23 RX ADMIN — DOCUSATE SODIUM 100 MG: 100 CAPSULE, LIQUID FILLED ORAL at 08:42

## 2023-05-23 RX ADMIN — ACETAMINOPHEN 650 MG: 325 TABLET, FILM COATED ORAL at 15:11

## 2023-05-23 RX ADMIN — IBUPROFEN 600 MG: 600 TABLET ORAL at 06:08

## 2023-05-23 RX ADMIN — Medication 1 TABLET: at 08:42

## 2023-05-23 RX ADMIN — IBUPROFEN 600 MG: 600 TABLET ORAL at 17:40

## 2023-05-23 RX ADMIN — HYDROCORTISONE 1 APPLICATION: 25 CREAM TOPICAL at 11:49

## 2023-05-23 RX ADMIN — DOCUSATE SODIUM 100 MG: 100 CAPSULE, LIQUID FILLED ORAL at 21:34

## 2023-05-23 RX ADMIN — ACETAMINOPHEN 650 MG: 325 TABLET, FILM COATED ORAL at 21:34

## 2023-05-23 RX ADMIN — Medication: at 06:08

## 2023-05-23 RX ADMIN — ACETAMINOPHEN 650 MG: 325 TABLET, FILM COATED ORAL at 02:43

## 2023-05-23 RX ADMIN — IBUPROFEN 600 MG: 600 TABLET ORAL at 11:49

## 2023-05-23 RX ADMIN — ACETAMINOPHEN 650 MG: 325 TABLET, FILM COATED ORAL at 08:42

## 2023-05-23 NOTE — LACTATION NOTE
This note was copied from a baby's chart.  Mom has [facility], unable to supply personal pump and she knows to call insurance company to obtain her pump.

## 2023-05-23 NOTE — PROGRESS NOTES
"[facility]  Vaginal Delivery Progress Note    Patient Name: Cinthia Cheatham  :  1990  MRN:  [id number]      Subjective   Postpartum Day 1: Vaginal Delivery () of a male infant, \"Saul\".    The patient feels well without complaints.  Her pain is well controlled.  Reports normal lochia.     The patient plans to breastfeed.    Objective     Vital Signs Range for the last 24 hours  Temperature: Temp:  [97.9 °F (36.6 °C)-99.2 °F (37.3 °C)] 97.9 °F (36.6 °C)       BP: BP: ()/(37-86) 108/68   Pulse: Heart Rate:  [] 94   Respirations: Resp:  [16-18] 16                       Physical Exam:  General: Awake and alert  Abdomen: Fundus: firm, non tender, 1 below umbilicus  Extremities:  trace edema, NT     Labs:     Results from last 7 days   Lab Units 23  0655 23  0401   WBC 10*3/mm3 17.14* 12.44*   HEMOGLOBIN g/dL 10.2* 13.3   HEMATOCRIT % 30.8* 38.4   PLATELETS 10*3/mm3 126* 142       Prenatal labs results reviewed:  Yes   Rubella:  Immune  Rh Status:    RH type   Date Value Ref Range Status   2023 Positive  Final         Assessment & Plan  : 1. PPD1 S/P  - Doing well, continue usual cares.           Pregnancy    History of  delivery, currently pregnant    40 weeks gestation of pregnancy          Daiana Cartwright, APRN  2023  09:21 EDT  "

## 2023-05-23 NOTE — ANESTHESIA POSTPROCEDURE EVALUATION
"Patient: Cinthia Cheatham    Procedure Summary     Date: 05/22/23 Room / Location:     Anesthesia Start: 1505 Anesthesia Stop: 2129    Procedure: LABOR ANALGESIA Diagnosis:     Scheduled Providers:  Provider: Harsh Gardner MD    Anesthesia Type: epidural ASA Status: 2          Anesthesia Type: epidural    Vitals  Vitals Value Taken Time   /62 05/23/23 0243   Temp 37.3 °C (99.2 °F) 05/23/23 0243   Pulse 94 05/23/23 0243   Resp 16 05/23/23 0243   SpO2 100 % 05/22/23 2226           Post Anesthesia Care and Evaluation    Anesthetic complications: No anesthetic complications    Comments: /62 (BP Location: Right arm, Patient Position: Lying)   Pulse 94   Temp 37.3 °C (99.2 °F) (Oral)   Resp 16   Ht 162.6 cm (64\")   Wt 73 kg (161 lb)   SpO2 100%   Breastfeeding Yes   BMI 27.64 kg/m²     No anesthesia complications reported to me.      "

## 2023-05-23 NOTE — LACTATION NOTE
This note was copied from a baby's chart.  P2 term baby, 10hrs old, low BGM last night and received glucose gel. BGM at 0730 was 71, Mom reports she tried breast feeding him but he was sleepy and uninterested. She reports good milk supply,  breast fed her first baby x1yr, is a stay at home Mom and does not plan on pumping much. Encouraged to call for any assistance or questions.

## 2023-05-23 NOTE — LACTATION NOTE
This note was copied from a baby's chart.  P2, T, 4250 gm LGA. BF other child 12.5 months with minimal issues. BF times 2 since birth with slight pinching during latch but she is confident she can correct. RN had just reviewed calling for bgms before feeding and attempting to feed every 2-3 hours.  Asked parents to review Postpartum and [place] handbook, [facility] info prn. Mom looked very tired so encouraged her to call for latch assist or if she wanted to review education in handbook. PBP faxed. LC # on WB.

## 2023-05-23 NOTE — LACTATION NOTE
This note was copied from a baby's chart.  Informed by RN baby had gel times 1 for bgm  38/42. Post feed bgm 61. Went in to check on mom and she was asleep.

## 2023-05-23 NOTE — PLAN OF CARE
Goal Outcome Evaluation:  Plan of Care Reviewed With: patient        Progress: improving  Outcome Evaluation:  after 50 minutes of pushing, extended repair due to friable tissue; QBL 988cc so TXA ordered and given; ice to perineum, straight cath with 1000cc urine drained; fundus firm, pt  in recovery, no c/o pain, VSS, sleeping in between care.

## 2023-05-23 NOTE — L&D DELIVERY NOTE
[facility]  Vaginal Birth after  Delivery Note    Patient Name: Cinthia Cheatham  :  1990  MRN:  [id number]          Pregnancy    History of  delivery, currently pregnant    40 weeks gestation of pregnancy      Labor status: Active spontaneous labor  in setting of prior       Delivery     Delivery: , Spontaneous     YOB: 2021    Time of Birth: 9:29 PM      Anesthesia: Epidural     Delivering clinician: Shirin Winston MD       Infant    Findings: Viable male  infant    Infant observations: Weight: 4250 g (9 lb 5.9 oz)   Observations/Comments:  panda room 6      Apgars: 2  @ 1 minute /    9  @ 5 minutes     Placenta, Cord, and Fluid    Placenta delivered  Spontaneous   at  2023  9:33 PM     Cord:   present.   Cord blood obtained: Yes    Cord gases obtained:  Yes      Repair    Episiotomy: No   Lacerations: Bilateral sulcal into 2nd degree midline- repaired; hemostatic right periurethral         Estimated Blood Loss:  Quantitative Blood Loss:    800 mls.  Quantitative Blood Loss (mL): 988 mL         Delivery narrative: The patient is a 32 y.o.  at 40w3d who was admitted on 2023 for spontaneous term labor. Patient labored spontaneously and declined augmentation with pitocin or amniotomy eventually. Eventually, patient consented to AROM. Amniotomy was performed with clear fluid noted. The patient entered an active phase of labor and progressed along a normal labor curve. The fetal heart tracing had intermittent periods of Cat II tracing due to variable decelerations. With repositioning and IV fluids, FHR recovered to Cat I. There was always good variability and response to fetal scalp stimulation. At 9.5cm dilated, I discussed with the patient the possibility that baby may not tolerated the remainder of labor or of pushing and that  section may be required however FHR recovery was noted with position changes. Once patient found to be  complete, she began pushing; no period of laboring down was permitted. She made forward progress with each contraction and FHR noted to recover between contractions with moderate variability.      With good maternal expulsive efforts, vaginal birth after  section of a viable male infant occurred. The head delivered atraumatically. The anterior and posterior shoulders delivered without difficulty. No nuchal cord was identified. The body was delivered atraumatically. The infant's nose and oropharynx were suctioned and cleared of secretions. Cord was immediately clamped and cut due to poor fetal tone and taken to warmer for assessment by peds. Cord blood and gases were obtained. Placenta delivered spontaneously, intact, with 3 vessel cord.    The vagina and perineum were inspected and bilateral sulcal lacerations were identified that led into a 2nd degree midline laceration; these were repaired with 3-0 Vicryl. A hemostatic right periurethral laceration was noted but not repaired. Cervix and rectum found to be intact. Mother and baby recovering in good condition.       Shirin Winston MD  [facility]   23  22:49 EDT

## 2023-05-24 VITALS
DIASTOLIC BLOOD PRESSURE: 61 MMHG | HEART RATE: 77 BPM | WEIGHT: 161 LBS | HEIGHT: 64 IN | RESPIRATION RATE: 16 BRPM | OXYGEN SATURATION: 100 % | SYSTOLIC BLOOD PRESSURE: 98 MMHG | BODY MASS INDEX: 27.49 KG/M2 | TEMPERATURE: 97.3 F

## 2023-05-24 RX ORDER — IBUPROFEN 600 MG/1
600 TABLET ORAL EVERY 6 HOURS SCHEDULED
Qty: 30 TABLET | Refills: 0 | Status: SHIPPED | OUTPATIENT
Start: 2023-05-24

## 2023-05-24 RX ADMIN — ACETAMINOPHEN 650 MG: 325 TABLET, FILM COATED ORAL at 03:55

## 2023-05-24 RX ADMIN — IBUPROFEN 600 MG: 600 TABLET ORAL at 06:14

## 2023-05-24 RX ADMIN — ACETAMINOPHEN 650 MG: 325 TABLET, FILM COATED ORAL at 10:15

## 2023-05-24 RX ADMIN — Medication 1 TABLET: at 08:43

## 2023-05-24 RX ADMIN — DOCUSATE SODIUM 100 MG: 100 CAPSULE, LIQUID FILLED ORAL at 08:43

## 2023-05-24 RX ADMIN — IBUPROFEN 600 MG: 600 TABLET ORAL at 00:10

## 2023-05-24 NOTE — DISCHARGE SUMMARY
Date of Discharge:  2023    Discharge Diagnosis:     Presenting Problem/History of Present Illness  Pregnancy [Z34.90]       Hospital Course  Patient is a 32 y.o. female presented with  of viable male infant.      Procedures Performed         Consults:   Consults     No orders found from 2023 to 2023.          Condition on Discharge:   Subjective   Postpartum Day 2 Vaginal Delivery.    The patient feels well without complaints.    Vital Signs  Temp:  [97.3 °F (36.3 °C)-98.4 °F (36.9 °C)] 97.3 °F (36.3 °C)  Heart Rate:  [77-97] 77  Resp:  [16-18] 16  BP: ()/(61-70) 98/61    Physical Exam:   General: Awake and alert   Abdomen: Fundus: firm, non tender    Extremities:  Calves NT bilaterally    Assessment & Plan     PPD2  S/P  -   Stable for discharge. Instructions reviewed      Discharge Disposition  Home or Self Care    Discharge Medications     Discharge Medications      New Medications      Instructions Start Date   ibuprofen 600 MG tablet  Commonly known as: ADVIL,MOTRIN   600 mg, Oral, Every 6 Hours Scheduled         Continue These Medications      Instructions Start Date   prenatal (CLASSIC) vitamin  tablet  Generic drug: prenatal vitamin   Oral, Daily               The patient has been prescribed a controlled substance.  She has been counseled on the risks associated with using the medication.   The addictive potential of this medication and alternatives were discussed carefully with this patient and she demonstrated understanding.  A IESHA report has been obtained and reviewed.       Activity at Discharge: restrictions reviewed    Follow-up Appointments  No future appointments.  Additional Instructions for the Follow-ups that You Need to Schedule     Call MD With Problems / Concerns   As directed      Call if increased pain, bleeding, or fever. Call if soaking a pad an hour or fist size clots. Call if signs of mastitis- redness, pain, fever. Nothing in the vagina for 6 weeks. No  driving while on narcotic, no driving for first week.    Order Comments: Call if increased pain, bleeding, or fever. Call if soaking a pad an hour or fist size clots. Call if signs of mastitis- redness, pain, fever. Nothing in the vagina for 6 weeks. No driving while on narcotic, no driving for first week.          Discharge Follow-up with Specified Provider: [facility]; 2 Weeks   As directed      To: [facility]    Follow Up: 2 Weeks               Test Results Pending at Discharge       PP anemia- asymptomatic- otc iron at home     Emilia Cardoso, APRN  05/24/23  09:46 EDT

## 2023-05-24 NOTE — LACTATION NOTE
This note was copied from a baby's chart.  P2, LGA, , Bf 1st for 1 yr. Glucose gel times one last night but all bgms stable today. She is having some discomfort when baby initially latches but corrects latch quickly. Weight appropriate and output adequate. Encouraged mom to to call for assistance with latching or any other concerns tonight. # on WB

## 2023-05-24 NOTE — LACTATION NOTE
Patient reports baby is BF well. She denies questions or needing assistance. Educated on baby's expected output and weight gain. Pt has [facility] info    Lactation Consult Note    Evaluation Completed: 2023 08:43 EDT  Patient Name: Cinthia Cheatham  :  1990  MRN:  [id number]     REFERRAL  INFORMATION:                                         DELIVERY HISTORY:        Skin to skin initiation date/time: 2023  9:45 PM   Skin to skin end date/time:           MATERNAL ASSESSMENT:                               INFANT ASSESSMENT:  Information for the patient's :  Carlota Cheatham [[id number]]   History reviewed. No pertinent past medical history.                                                                                                     MATERNAL INFANT FEEDING:                                                                       EQUIPMENT TYPE:                                 BREAST PUMPING:          LACTATION REFERRALS:

## 2023-05-27 ENCOUNTER — HOSPITAL ENCOUNTER (EMERGENCY)
Facility: HOSPITAL | Age: 33
Discharge: HOME | End: 2023-05-27
Attending: EMERGENCY MEDICINE
Payer: COMMERCIAL

## 2023-05-27 VITALS
OXYGEN SATURATION: 100 % | SYSTOLIC BLOOD PRESSURE: 117 MMHG | TEMPERATURE: 99.1 F | HEART RATE: 93 BPM | WEIGHT: 161 LBS | DIASTOLIC BLOOD PRESSURE: 71 MMHG | HEIGHT: 64 IN | BODY MASS INDEX: 27.49 KG/M2 | RESPIRATION RATE: 16 BRPM

## 2023-05-27 PROBLEM — O34.219 VBAC (VAGINAL BIRTH AFTER CESAREAN): Status: ACTIVE | Noted: 2023-05-27

## 2023-05-27 PROCEDURE — 99283 EMERGENCY DEPT VISIT LOW MDM: CPT

## 2023-05-27 NOTE — CONSULTS
Sent to ER for eval of vaginal laceration pain and bleeding. Her mom is MD and looked at it and thought it was .    .[facility]  Obstetric /GYN   ER consult note    Chief Complaint   Patient presents with   • Vaginal Bleeding       Subjective      5 days s/p  [id number] degree vag laceration. Vag pain getting worse and then started bleeding a little. Her mom is physician and thought it looked . No foul d/c. Only taking motrin/ tylenol. Pain on left getting worse. Voiding ok. bm ok yesterday.      Baby and her mom with her in ER.          PROBLEM LIST     (vaginal birth after )    Obstetric vaginal laceration with second degree perineal laceration      Past OB History:       OB History    Para Term  AB Living   3 2 2 0 1 2   SAB IAB Ectopic Molar Multiple Live Births   1 0 0 0 0 2      # Outcome Date GA Lbr Js/2nd Weight Sex Delivery Anes PTL Lv   3 Term 23 40w3d 07:50 / 00:54 4250 g (9 lb 5.9 oz) M  EPI N DAISHA      Birth Comments: panda room 6      Complications: Fetal Intolerance      Name: SARAH DUVALL      Apgar1: 2  Apgar5: 9   2 SAB            1 Term  41w0d  3827 g (8 lb 7 oz) M CS-LTranv EPI  DAISHA      Complications: Fetal Intolerance       Past Medical History: Past Medical History:   Diagnosis Date   • Asthma       Past Surgical History Past Surgical History:   Procedure Laterality Date   •  SECTION     • DILATATION AND CURETTAGE       after miscarriage   • LASIK Bilateral    • WISDOM TOOTH EXTRACTION        Family History: No family history on file.   Social History:  reports that she has never smoked. She does not have any smokeless tobacco history on file.   reports that she does not currently use alcohol.   reports no history of drug use.    Allergies:     Patient has no known allergies.       Objective       Vital Signs Range for the last 24 hours  Temperature: Temp:  [99.1 °F (37.3 °C)] 99.1 °F (37.3 °C)   Temp Source: Temp src:  Tympanic   BP: BP: (108-128)/(65-83) 117/71   Pulse: Heart Rate:  [93] 93   Respirations: Resp:  [16] 16                   Physical Examination:     General :  Alert in NAD  Abdomen: soft, nontender    Perineum-- at 5 oclock of introitus, superficial layer of sutures , not tender, not bleeding but could get blood on swab when dab it. Not infected with no cellulitis, not indurated. Healing well other than the superficial layer . separaced about 1 cm and only a few mm deep. No hematoma.            Assessment & Plan       Assessment:  1.   Post partum perineal laceration pain and superficial separation.    D/w pt/ her mom. Not infected and not bleeding. D/w option of going to OR to try to re approximate but I don't recommend. Tissue gets friable and I think would just pull loose again. D/w should heal fine but may have an asymmetry to the introitus when healed.  Gave precautions    Sent oxycodone 5mg # 30 to her pharmacy.    Will see her back in office this week for recheck          Plan:   Plan of care has been reviewed with patient and family,.   All questions answered.                Jayashree Quevedo MD  5/27/2023  15:46 EDT

## 2023-05-27 NOTE — ED PROVIDER NOTES
EMERGENCY DEPARTMENT ENCOUNTER    Room Number:  39/39  Date of encounter:  2023  PCP: Kym Escalante MD  Historian: Patient and mother  Relevant information and history provided by sources other than the patient will be included below and in the ED Course.  Review of pertinent past medical records may also be included in record below and ED Course.    HPI:  Chief Complaint: Bleeding from vagina at site of episiotomy  A complete HPI/ROS/PMH/PSH/SH/FH are unobtainable due to: Not applicable  Context: Cinthia Cheatham is a 32 y.o. female who presents to the ED c/o patient has noted small amount of bleeding from the episiotomy site at her vagina.  Also has some mild increased pain when you touch that area.  Has not placed anything in her vagina and has not had any sexual intercourse since delivery of the baby.  Denies any abdominal pain, chest pain, shortness of breath, nausea or vomiting.  Otherwise is doing okay denies any other complaints.  She is concerned that the episiotomy has opened up.  She spoke with [facility] and she was sent here to the emergency department further evaluation.        Previous Episodes: No  Current Symptoms: Only asymptomatic sitting in the bed.  Does have some mild tenderness to the area when you press at the previous site of the episiotomy.    MEDICAL HISTORY REVIEWED  Patient was discharged from the hospital 2023.  On 2023 had a spontaneous vaginal delivery she did have in reviewing the delivery note bilateral sulcal lacerations that led into a second-degree midline laceration they were repaired with 3-0 Vicryl and hemostatic right periurethral laceration was noted but not repaired      PAST MEDICAL HISTORY  Active Ambulatory Problems     Diagnosis Date Noted   • Pregnancy 2023   • History of  delivery, currently pregnant 2023   • 40 weeks gestation of pregnancy 2023     Resolved Ambulatory Problems     Diagnosis Date Noted   • No  Resolved Ambulatory Problems     Past Medical History:   Diagnosis Date   • Asthma          PAST SURGICAL HISTORY  Past Surgical History:   Procedure Laterality Date   •  SECTION     • DILATATION AND CURETTAGE       after miscarriage   • LASIK Bilateral    • WISDOM TOOTH EXTRACTION           FAMILY HISTORY  No family history on file.      SOCIAL HISTORY  Social History     Socioeconomic History   • Marital status:    Tobacco Use   • Smoking status: Never   Vaping Use   • Vaping Use: Never used   Substance and Sexual Activity   • Alcohol use: Not Currently   • Drug use: Never   • Sexual activity: Yes     Partners: Male         ALLERGIES  Patient has no known allergies.        REVIEW OF SYSTEMS  Review of Systems     All systems reviewed and negative except for those discussed in HPI.       PHYSICAL EXAM    I have reviewed the triage vital signs and nursing notes.    ED Triage Vitals   Temp Heart Rate Resp BP SpO2   23 1122 23 1122 23 1122 23 1131 23 1122   99.1 °F (37.3 °C) 93 16 117/76 100 %      Temp src Heart Rate Source Patient Position BP Location FiO2 (%)   23 1122 23 1122 -- -- --   Tympanic Monitor          GENERAL: Young female.  No acute distress.Vital signs on my initial evaluation unremarkable  HENT: nares patent  Head/neck/ face are symmetric without gross deformity, signs of trauma, or swelling  EYES: no scleral icterus, no conjunctival pallor.  NECK: Supple, no meningismus  CV: regular rhythm, regular rate with intact distal pulses.  RESPIRATORY: normal effort and no respiratory distress.  ABDOMEN: soft and nontender.  On pelvic exam it was performed with chaperone present.  Patient has a small area where it appears as if the episiotomy has open.  There is no active bleeding.  There is no purulent drainage or any obvious infection.  No obvious or significant pain on exam or palpation.  MUSCULOSKELETAL: no deformity.  NEURO: alert and  appropriate, moves all extremities, follows commands.  No focal motor or sensory change  SKIN: warm, dry    Vital signs and nursing notes reviewed.        LAB RESULTS  No results found for this or any previous visit (from the past 24 hour(s)).    Ordered the above labs and independently reviewed the results.        RADIOLOGY  No Radiology Exams Resulted Within Past 24 Hours    I ordered the above noted radiological studies. Reviewed by me and discussed with radiologist.  See dictation for official radiology interpretation.      PROCEDURES    Procedures      MEDICATIONS GIVEN IN ER    Medications - No data to display      All labs have been independently reviewed by me.  All radiology studies have been reviewed by me and I discussed with radiologist dictating the report when indicated below.  All EKG's independently viewed and interpreted by me.  Discussion below represents my analysis of pertinent findings related to patient's condition, differential diagnosis, treatment plan and final disposition.        PROGRESS, DATA ANALYSIS, CONSULTS, AND MEDICAL DECISION MAKING     who is on for observation gynecologist came down and saw the patient.  I talked with her as well.  She states that she would not suture or close this up as they would not hold.  It is not actively bleeding to any significant degree and there is no signs of infection.  She talked with the patient and mother recommends warm soapy water sitz bath's 3 times a day.  [facility] either with Dr. Quevedo or the other physician Dr. Escalante in that practice.  All questions answered.           AS OF 16:04 EDT VITALS:    BP - 117/71  HR - 93  TEMP - 99.1 °F (37.3 °C) (Tympanic)  02 SATS - 100%    SOCIAL DETERMINANTS OF HEALTH THAT IMPACT OR LIMIT CARE (For example..Homelessness,safe discharge, inability to obtain care, follow up, or prescriptions):      DIAGNOSIS  Final diagnoses:   Episiotomy dehiscence          DISPOSITION  DISCHARGE    Patient discharged in stable condition.    Reviewed implications of results, diagnosis, meds, responsibility to follow up, warning signs and symptoms of possible worsening, potential complications and reasons to return to ER, including worsening of symptoms, any fever, worsening of pain, any worsening of bleeding, or any other concerns..    Patient/Family voiced understanding of above instructions.    Discussed plan for discharge, as there is no emergent indication for admission. Pt/family is agreeable and understands need for follow up and repeat testing.  Pt is aware that discharge does not mean that nothing is wrong but it indicates no emergency is present that requires admission and they must continue care with follow-up as given below or physician of their choice.     FOLLOW-UP  Kym Escalante MD  [address] [address]  [phone]    In 1 week  Return if pain worsens, shortness of breath, fever, any concerns         Medication List      No changes were made to your prescriptions during this visit.                 DICTATED UTILIZING DRAGON DICTATION    Note Disclaimer: At [facility], we believe that sharing information builds trust and better relationships. You are receiving this note because you recently visited [facility]. It is possible you will see health information before a provider has talked with you about it. This kind of information can be easy to misunderstand. To help you fully understand what it means for your health, we urge you to discuss this note with your provider.     Rajendra Ponce MD  05/27/23 9525

## 2023-05-27 NOTE — ED TRIAGE NOTES
Pt gave birth 5/22.  She has a vag lac and reports she was bleeding last night.  [facility] called [facility] and was told to come in

## 2023-05-27 NOTE — DISCHARGE INSTRUCTIONS
As we discussed, make sure you do sitz bath's and warm soapy water 3 times a day.  You can apply moist heat to that region as well.  Make sure you follow-up with [facility] as scheduled.  Return if any fever, worsening of bleeding, any purulent drainage, or any other concerns.

## 2024-10-23 ENCOUNTER — PATIENT ROUNDING (BHMG ONLY) (OUTPATIENT)
Dept: INTERNAL MEDICINE | Facility: CLINIC | Age: 34
End: 2024-10-23
Payer: COMMERCIAL

## 2024-10-23 ENCOUNTER — OFFICE VISIT (OUTPATIENT)
Dept: INTERNAL MEDICINE | Facility: CLINIC | Age: 34
End: 2024-10-23
Payer: COMMERCIAL

## 2024-10-23 VITALS
WEIGHT: 132.2 LBS | HEIGHT: 64 IN | TEMPERATURE: 97.1 F | DIASTOLIC BLOOD PRESSURE: 88 MMHG | BODY MASS INDEX: 22.57 KG/M2 | HEART RATE: 80 BPM | SYSTOLIC BLOOD PRESSURE: 106 MMHG | OXYGEN SATURATION: 99 %

## 2024-10-23 DIAGNOSIS — Z11.59 ENCOUNTER FOR HEPATITIS C SCREENING TEST FOR LOW RISK PATIENT: ICD-10-CM

## 2024-10-23 DIAGNOSIS — Z83.3 FAMILY HISTORY OF DIABETES MELLITUS (DM): ICD-10-CM

## 2024-10-23 DIAGNOSIS — Z00.00 ANNUAL PHYSICAL EXAM: Primary | ICD-10-CM

## 2024-10-23 DIAGNOSIS — J45.20 MILD INTERMITTENT ASTHMA WITHOUT COMPLICATION: ICD-10-CM

## 2024-10-23 PROCEDURE — 99385 PREV VISIT NEW AGE 18-39: CPT | Performed by: STUDENT IN AN ORGANIZED HEALTH CARE EDUCATION/TRAINING PROGRAM

## 2024-10-23 RX ORDER — MULTIPLE VITAMINS W/ MINERALS TAB 9MG-400MCG
1 TAB ORAL DAILY
COMMUNITY

## 2024-10-23 RX ORDER — ALBUTEROL SULFATE 90 UG/1
INHALANT RESPIRATORY (INHALATION)
COMMUNITY
Start: 2024-08-02

## 2024-10-23 NOTE — PROGRESS NOTES
October 23, 2024    Hello, may I speak with Cinthia Cheatham?    My name is CHUCHO       I am  with [facility]  [address]  [place] [address].    Before we get started may I verify your date of birth? 1990    I am calling to officially welcome you to our practice and ask about your recent visit. Is this a good time to talk? yes    Tell me about your visit with us. What things went well?  VISIT WENT GREAT        We're always looking for ways to make our patients' experiences even better. Do you have recommendations on ways we may improve?  no    Overall were you satisfied with your first visit to our practice? yes       I appreciate you taking the time to speak with me today. Is there anything else I can do for you? no      Thank you, and have a great day.

## 2024-10-23 NOTE — PROGRESS NOTES
New Patient Office Visit      Patient Name: Cinthia Cheatham  : 1990   MRN: [id number]   Care Team: Patient Care Team:  Pavan Evans MD as PCP - General (Internal Medicine)    Chief Complaint:    Chief Complaint   Patient presents with    [facility]    Annual Exam       History of Present Illness: Cinthia Cheatham is a 33 y.o. female who is here today to establish care as a new patient    She has not seen a primary care physician, she has no acute complaints or concerns.  She does have a medical history notable for mild intermittent asthma, currently well-controlled on albuterol as needed.    She is interested in obtaining labs and performing physical today, she does have a family history of type II diabetes, and she would like to make sure that she does not have any risk factors or signs of diabetes currently.    She currently has 2 children, reports a history of  and most recent vaginal birth.  She states she currently does not work,  works full time as she takes care of her children.     Denies any smoking history, very rarely alcohol use.         Subjective      Review of Systems:   Review of Systems   Constitutional:  Negative for chills, fever and unexpected weight loss.   HENT:  Negative for congestion, postnasal drip and trouble swallowing.    Eyes:  Negative for blurred vision, double vision and pain.   Respiratory:  Negative for cough, chest tightness and shortness of breath.    Cardiovascular:  Negative for chest pain and leg swelling.   Gastrointestinal:  Negative for abdominal pain, blood in stool, diarrhea, nausea and vomiting.   Genitourinary:  Negative for dysuria, hematuria and urgency.   Musculoskeletal:  Negative for arthralgias, joint swelling and myalgias.   Skin:  Negative for rash, skin lesions and wound.   Neurological:  Negative for dizziness, weakness and headache.   Psychiatric/Behavioral:  Negative for depressed mood and stress. The patient is not  "nervous/anxious.     - See HPI    Past Medical History:   Past Medical History:   Diagnosis Date    Asthma        Past Surgical History:   Past Surgical History:   Procedure Laterality Date     SECTION      DILATATION AND CURETTAGE       after miscarriage    LASIK Bilateral     WISDOM TOOTH EXTRACTION         Family History: History reviewed. No pertinent family history.    Social History:   Social History     Socioeconomic History    Marital status:    Tobacco Use    Smoking status: Never     Passive exposure: Never    Smokeless tobacco: Never   Vaping Use    Vaping status: Never Used   Substance and Sexual Activity    Alcohol use: Not Currently     Comment: rare    Drug use: Never    Sexual activity: Yes     Partners: Male       Tobacco History:   Social History     Tobacco Use   Smoking Status Never    Passive exposure: Never   Smokeless Tobacco Never       Medications:     Current Outpatient Medications:     albuterol sulfate  (90 Base) MCG/ACT inhaler, , Disp: , Rfl:     ibuprofen (ADVIL,MOTRIN) 600 MG tablet, Take 1 tablet by mouth Every 6 (Six) Hours., Disp: 30 tablet, Rfl: 0    multivitamin with minerals tablet tablet, Take 1 tablet by mouth Daily., Disp: , Rfl:     Tretinoin, Facial Wrinkles, 0.02 % cream, Apply  topically., Disp: , Rfl:     Allergies:   No Known Allergies    Objective     Physical Exam:  Vital Signs:   Vitals:    10/23/24 1504   BP: 106/88   Pulse: 80   Temp: 97.1 °F (36.2 °C)   TempSrc: Temporal   SpO2: 99%   Weight: 60 kg (132 lb 3.2 oz)   Height: 162.6 cm (64\")     Body mass index is 22.69 kg/m².     Physical Exam  Vitals reviewed.   Constitutional:       General: She is not in acute distress.     Appearance: Normal appearance.   HENT:      Head: Normocephalic and atraumatic.      Right Ear: External ear normal.      Left Ear: External ear normal.      Nose: Nose normal. No rhinorrhea.   Eyes:      General:         Right eye: No discharge.         Left eye: No " discharge.      Extraocular Movements: Extraocular movements intact.      Conjunctiva/sclera: Conjunctivae normal.   Cardiovascular:      Rate and Rhythm: Normal rate and regular rhythm.      Heart sounds: Normal heart sounds.   Pulmonary:      Effort: Pulmonary effort is normal. No respiratory distress.      Breath sounds: Normal breath sounds.   Abdominal:      General: Abdomen is flat. There is no distension.      Palpations: Abdomen is soft.   Musculoskeletal:         General: No swelling or deformity. Normal range of motion.      Cervical back: Normal range of motion and neck supple.   Skin:     General: Skin is warm and dry.   Neurological:      General: No focal deficit present.      Mental Status: She is alert and oriented to person, place, and time. Mental status is at baseline.   Psychiatric:         Mood and Affect: Mood normal.         Behavior: Behavior normal.         Assessment / Plan      Assessment/Plan:   Problems Addressed This Visit  Diagnoses and all orders for this visit:    1. Annual physical exam (Primary)  -     Lipid Panel With / Chol / HDL Ratio  -     CBC & Differential  -     Hemoglobin A1c  -     Comprehensive Metabolic Panel  -     TSH+Free T4    2. Family history of diabetes mellitus (DM)    3. Mild intermittent asthma without complication    4. Encounter for hepatitis C screening test for low risk patient  -     Hepatitis C Antibody      Immunizations: recommend COVID, given asthma hx she may be due/indicated for pneumonia. Can consider in future  Cancer screening: No indication for earlier screening given age and no increased risk factors  Metabolic Labs: ordered today  Recommend maintaining a healthy lifestyle, rich in whole grains, fruits and vegetables. Limit high saturated fats and processed sugars. Maintain an active lifestyle with goal of 150 min of moderate aerobic exercise per week      Asthma is chronic, mild and very intermittent.  Continue albuterol as needed    Return to  clinic in about 1 year for annual physical or sooner as needed      Plan of care reviewed with patient at the conclusion of today's visit. Education was provided regarding diagnosis and management.  Patient verbalizes understanding of and agreement with management plan.      Follow Up:   Return in about 1 year (around 10/23/2025) for Annual physical.          MD KIARA [facility]pos [facility]  [address] [place] [phone]  Fax [phone]      \

## 2024-10-24 LAB
ALBUMIN SERPL-MCNC: 4.4 G/DL (ref 3.5–5.2)
ALBUMIN/GLOB SERPL: 1.8 G/DL
ALP SERPL-CCNC: 61 U/L (ref 39–117)
ALT SERPL-CCNC: 12 U/L (ref 1–33)
AST SERPL-CCNC: 13 U/L (ref 1–32)
BASOPHILS # BLD AUTO: 0.05 10*3/MM3 (ref 0–0.2)
BASOPHILS NFR BLD AUTO: 0.7 % (ref 0–1.5)
BILIRUB SERPL-MCNC: 0.4 MG/DL (ref 0–1.2)
BUN SERPL-MCNC: 9 MG/DL (ref 6–20)
BUN/CREAT SERPL: 11.7 (ref 7–25)
CALCIUM SERPL-MCNC: 9.1 MG/DL (ref 8.6–10.5)
CHLORIDE SERPL-SCNC: 103 MMOL/L (ref 98–107)
CHOLEST SERPL-MCNC: 193 MG/DL (ref 0–200)
CHOLEST/HDLC SERPL: 2.76 {RATIO}
CO2 SERPL-SCNC: 25 MMOL/L (ref 22–29)
CREAT SERPL-MCNC: 0.77 MG/DL (ref 0.57–1)
EGFRCR SERPLBLD CKD-EPI 2021: 105.9 ML/MIN/1.73
EOSINOPHIL # BLD AUTO: 0.03 10*3/MM3 (ref 0–0.4)
EOSINOPHIL NFR BLD AUTO: 0.4 % (ref 0.3–6.2)
ERYTHROCYTE [DISTWIDTH] IN BLOOD BY AUTOMATED COUNT: 12.2 % (ref 12.3–15.4)
GLOBULIN SER CALC-MCNC: 2.5 GM/DL
GLUCOSE SERPL-MCNC: 83 MG/DL (ref 65–99)
HBA1C MFR BLD: 5.1 % (ref 4.8–5.6)
HCT VFR BLD AUTO: 41.1 % (ref 34–46.6)
HCV IGG SERPL QL IA: NON REACTIVE
HDLC SERPL-MCNC: 70 MG/DL (ref 40–60)
HGB BLD-MCNC: 13.9 G/DL (ref 12–15.9)
IMM GRANULOCYTES # BLD AUTO: 0.02 10*3/MM3 (ref 0–0.05)
IMM GRANULOCYTES NFR BLD AUTO: 0.3 % (ref 0–0.5)
LDLC SERPL CALC-MCNC: 115 MG/DL (ref 0–100)
LYMPHOCYTES # BLD AUTO: 2.36 10*3/MM3 (ref 0.7–3.1)
LYMPHOCYTES NFR BLD AUTO: 33.4 % (ref 19.6–45.3)
MCH RBC QN AUTO: 30.7 PG (ref 26.6–33)
MCHC RBC AUTO-ENTMCNC: 33.8 G/DL (ref 31.5–35.7)
MCV RBC AUTO: 90.7 FL (ref 79–97)
MONOCYTES # BLD AUTO: 0.45 10*3/MM3 (ref 0.1–0.9)
MONOCYTES NFR BLD AUTO: 6.4 % (ref 5–12)
NEUTROPHILS # BLD AUTO: 4.16 10*3/MM3 (ref 1.7–7)
NEUTROPHILS NFR BLD AUTO: 58.8 % (ref 42.7–76)
NRBC BLD AUTO-RTO: 0 /100 WBC (ref 0–0.2)
PLATELET # BLD AUTO: 214 10*3/MM3 (ref 140–450)
POTASSIUM SERPL-SCNC: 3.9 MMOL/L (ref 3.5–5.2)
PROT SERPL-MCNC: 6.9 G/DL (ref 6–8.5)
RBC # BLD AUTO: 4.53 10*6/MM3 (ref 3.77–5.28)
SODIUM SERPL-SCNC: 140 MMOL/L (ref 136–145)
T4 FREE SERPL-MCNC: 1.23 NG/DL (ref 0.92–1.68)
TRIGL SERPL-MCNC: 40 MG/DL (ref 0–150)
TSH SERPL DL<=0.005 MIU/L-ACNC: 1.55 UIU/ML (ref 0.27–4.2)
VLDLC SERPL CALC-MCNC: 8 MG/DL (ref 5–40)
WBC # BLD AUTO: 7.07 10*3/MM3 (ref 3.4–10.8)

## 2024-10-25 NOTE — PROGRESS NOTES
Labs all look great, no signs of diabetes or pre-diabetes.     Cholesterol is slightly high but nothing to worry about or start medication. Continue staying active and having a healthy diet

## 2025-01-03 ENCOUNTER — TELEPHONE (OUTPATIENT)
Dept: INTERNAL MEDICINE | Facility: CLINIC | Age: 35
End: 2025-01-03
Payer: COMMERCIAL

## 2025-01-03 NOTE — TELEPHONE ENCOUNTER
They are going to need to be seen by urgent care or ER for this. There are certain medications that can treat this but given her wife's pregnancy, this needs to be seen and evaluated by someone to confirm diagnosis.

## 2025-01-03 NOTE — TELEPHONE ENCOUNTER
Caller: [email or web address]    Relationship: Emergency Contact    Best call back number: [phone]    What medication are you requesting: ANTIBIOTIC    What are your current symptoms: CONGESTION, RUNNY NOSE, SORE THROAT, TONSILS SWOLLEN, COUGH, PINK EYE    How long have you been experiencing symptoms: 1 WEEK    Have you had these symptoms before:    [x] Yes  [] No    Have you been treated for these symptoms before:   [x] Yes  [] No    If a prescription is needed, what is your preferred pharmacy and phone number: [facility] [id number] - [facility] [address] [facility] [address] ([facility] - [phone] [facility] [phone] FX     Additional notes: PATIENT IS CURRENTLY PREGNANT    PATIENT HAS BEEN EXPOSED TO HAEMOPHILUS BACTERIA INFECTION AND ALL CHILDREN HAVE BEEN PUT ON MEDICATION.